# Patient Record
Sex: FEMALE | Race: WHITE | NOT HISPANIC OR LATINO | Employment: OTHER | RURAL
[De-identification: names, ages, dates, MRNs, and addresses within clinical notes are randomized per-mention and may not be internally consistent; named-entity substitution may affect disease eponyms.]

---

## 2020-09-25 ENCOUNTER — HISTORICAL (OUTPATIENT)
Dept: ADMINISTRATIVE | Facility: HOSPITAL | Age: 60
End: 2020-09-25

## 2020-09-25 LAB
ANION GAP SERPL CALCULATED.3IONS-SCNC: 12 MMOL/L
BASOPHILS # BLD AUTO: 0.06 X10E3/UL (ref 0–0.2)
BASOPHILS NFR BLD AUTO: 0.6 % (ref 0–1)
BILIRUB UR QL STRIP: NEGATIVE MG/DL
BUN SERPL-MCNC: 6 MG/DL (ref 7–18)
CALCIUM SERPL-MCNC: 9.4 MG/DL (ref 8.5–10.1)
CHLORIDE SERPL-SCNC: 97 MMOL/L (ref 98–107)
CLARITY UR: CLEAR
CO2 SERPL-SCNC: 30 MMOL/L (ref 21–32)
COLOR UR: ABNORMAL
CREAT SERPL-MCNC: 0.66 MG/DL (ref 0.55–1.02)
EOSINOPHIL # BLD AUTO: 0.12 X10E3/UL (ref 0–0.5)
EOSINOPHIL NFR BLD AUTO: 1.2 % (ref 1–4)
ERYTHROCYTE [DISTWIDTH] IN BLOOD BY AUTOMATED COUNT: 13.3 % (ref 11.5–14.5)
GLUCOSE SERPL-MCNC: 283 MG/DL (ref 70–105)
GLUCOSE SERPL-MCNC: 310 MG/DL (ref 74–106)
GLUCOSE SERPL-MCNC: 331 MG/DL (ref 70–105)
GLUCOSE UR STRIP-MCNC: >=1000 MG/DL
HCT VFR BLD AUTO: 46.5 % (ref 38–47)
HGB BLD-MCNC: 15.3 G/DL (ref 12–16)
IMM GRANULOCYTES # BLD AUTO: 0.04 X10E3/UL (ref 0–0.04)
IMM GRANULOCYTES NFR BLD: 0.4 % (ref 0–0.4)
KETONES UR STRIP-SCNC: NEGATIVE MG/DL
LEUKOCYTE ESTERASE UR QL STRIP: NEGATIVE LEU/UL
LYMPHOCYTES # BLD AUTO: 2.91 X10E3/UL (ref 1–4.8)
LYMPHOCYTES NFR BLD AUTO: 30.1 % (ref 27–41)
MCH RBC QN AUTO: 28.5 PG (ref 27–31)
MCHC RBC AUTO-ENTMCNC: 32.9 G/DL (ref 32–36)
MCV RBC AUTO: 86.6 FL (ref 80–96)
MONOCYTES # BLD AUTO: 0.86 X10E3/UL (ref 0–0.8)
MONOCYTES NFR BLD AUTO: 8.9 % (ref 2–6)
MPC BLD CALC-MCNC: 11.7 FL (ref 9.4–12.4)
NEUTROPHILS # BLD AUTO: 5.69 X10E3/UL (ref 1.8–7.7)
NEUTROPHILS NFR BLD AUTO: 58.8 % (ref 53–65)
NITRITE UR QL STRIP: NEGATIVE
NRBC # BLD AUTO: 0 X10E3/UL (ref 0–0)
NRBC, AUTO (.00): 0 /100 (ref 0–0)
PH UR STRIP: 6 PH UNITS (ref 5–8)
PLATELET # BLD AUTO: 224 X10E3/UL (ref 150–400)
POTASSIUM SERPL-SCNC: 3.9 MMOL/L (ref 3.5–5.1)
PROT UR QL STRIP: NEGATIVE MG/DL
RBC # BLD AUTO: 5.37 X10E6/UL (ref 4.2–5.4)
RBC # UR STRIP: NEGATIVE ERY/UL
SODIUM SERPL-SCNC: 135 MMOL/L (ref 136–145)
SP GR UR STRIP: <=1.005 (ref 1–1.03)
UROBILINOGEN UR STRIP-ACNC: 0.2 EU/DL
WBC # BLD AUTO: 9.68 X10E3/UL (ref 4.5–11)

## 2021-03-31 ENCOUNTER — HOSPITAL ENCOUNTER (INPATIENT)
Facility: HOSPITAL | Age: 61
LOS: 5 days | Discharge: HOME | DRG: 253 | End: 2021-04-05
Attending: STUDENT IN AN ORGANIZED HEALTH CARE EDUCATION/TRAINING PROGRAM | Admitting: SURGERY

## 2021-03-31 DIAGNOSIS — I96 GANGRENOUS TOE: ICD-10-CM

## 2021-03-31 DIAGNOSIS — I75.022 BLUE TOE SYNDROME OF LEFT LOWER EXTREMITY: ICD-10-CM

## 2021-03-31 DIAGNOSIS — I70.209 ATHEROSCLEROTIC PVD WITH ULCERATION: Primary | ICD-10-CM

## 2021-03-31 DIAGNOSIS — I10 HTN (HYPERTENSION): ICD-10-CM

## 2021-03-31 DIAGNOSIS — I70.243 ATHEROSCLEROSIS OF NATIVE ARTERY OF LEFT LOWER EXTREMITY WITH ULCERATION OF ANKLE: ICD-10-CM

## 2021-03-31 DIAGNOSIS — L98.499 ATHEROSCLEROTIC PVD WITH ULCERATION: Primary | ICD-10-CM

## 2021-03-31 PROBLEM — L97.509 FOOT ULCER: Status: ACTIVE | Noted: 2021-03-31

## 2021-03-31 PROBLEM — Z72.0 TOBACCO ABUSE: Status: ACTIVE | Noted: 2021-03-31

## 2021-03-31 LAB
ABO: NORMAL
ANION GAP SERPL CALCULATED.3IONS-SCNC: 15 MMOL/L
ANTIBODY SCREEN: NORMAL
BASOPHILS # BLD AUTO: 0.07 X10E3/UL (ref 0–0.2)
BASOPHILS NFR BLD AUTO: 0.8 % (ref 0–1)
BUN SERPL-MCNC: 5 MG/DL (ref 7–18)
CALCIUM SERPL-MCNC: 9 MG/DL (ref 8.5–10.1)
CHLORIDE SERPL-SCNC: 99 MMOL/L (ref 98–107)
CO2 SERPL-SCNC: 27 MMOL/L (ref 21–32)
CREAT SERPL-MCNC: 0.75 MG/DL (ref 0.55–1.02)
EOSINOPHIL # BLD AUTO: 0.03 X10E3/UL (ref 0–0.5)
EOSINOPHIL NFR BLD AUTO: 0.3 % (ref 1–4)
ERYTHROCYTE [DISTWIDTH] IN BLOOD BY AUTOMATED COUNT: 13 % (ref 11.5–14.5)
FLUAV AG UPPER RESP QL IA.RAPID: NEGATIVE
FLUBV AG UPPER RESP QL IA.RAPID: NEGATIVE
GLUCOSE SERPL-MCNC: 294 MG/DL (ref 70–105)
GLUCOSE SERPL-MCNC: 305 MG/DL (ref 70–105)
GLUCOSE SERPL-MCNC: 328 MG/DL (ref 74–106)
HCT VFR BLD AUTO: 45.3 % (ref 38–47)
HGB BLD-MCNC: 14.4 G/DL (ref 12–16)
IMM GRANULOCYTES # BLD AUTO: 0.02 X10E3/UL (ref 0–0.04)
IMM GRANULOCYTES NFR BLD: 0.2 % (ref 0–0.4)
LYMPHOCYTES # BLD AUTO: 2.29 X10E3/UL (ref 1–4.8)
LYMPHOCYTES NFR BLD AUTO: 26.3 % (ref 27–41)
MCH RBC QN AUTO: 28.1 PG (ref 27–31)
MCHC RBC AUTO-ENTMCNC: 31.8 G/DL (ref 32–36)
MCV RBC AUTO: 88.5 FL (ref 80–96)
MONOCYTES # BLD AUTO: 0.63 X10E3/UL (ref 0–0.8)
MONOCYTES NFR BLD AUTO: 7.2 % (ref 2–6)
MPC BLD CALC-MCNC: 11 FL (ref 9.4–12.4)
NEUTROPHILS # BLD AUTO: 5.66 X10E3/UL (ref 1.8–7.7)
NEUTROPHILS NFR BLD AUTO: 65.2 % (ref 53–65)
NRBC # BLD AUTO: 0 X10E3/UL (ref 0–0)
NRBC, AUTO (.00): 0 /100 (ref 0–0)
PLATELET # BLD AUTO: 236 X10E3/UL (ref 150–400)
POTASSIUM SERPL-SCNC: 4.1 MMOL/L (ref 3.5–5.1)
RBC # BLD AUTO: 5.12 X10E6/UL (ref 4.2–5.4)
RH TYPE: NORMAL
SARS-COV+SARS-COV-2 AG RESP QL IA.RAPID: NEGATIVE
SODIUM SERPL-SCNC: 137 MMOL/L (ref 136–145)
WBC # BLD AUTO: 8.7 X10E3/UL (ref 4.5–11)

## 2021-03-31 PROCEDURE — 25000003 PHARM REV CODE 250: Performed by: NURSE PRACTITIONER

## 2021-03-31 PROCEDURE — 93010 ELECTROCARDIOGRAM REPORT: CPT | Mod: ,,, | Performed by: INTERNAL MEDICINE

## 2021-03-31 PROCEDURE — 93010 EKG 12-LEAD: ICD-10-PCS | Mod: ,,, | Performed by: INTERNAL MEDICINE

## 2021-03-31 PROCEDURE — 85025 COMPLETE CBC W/AUTO DIFF WBC: CPT

## 2021-03-31 PROCEDURE — 82962 GLUCOSE BLOOD TEST: CPT

## 2021-03-31 PROCEDURE — 36415 COLL VENOUS BLD VENIPUNCTURE: CPT

## 2021-03-31 PROCEDURE — 11000001 HC ACUTE MED/SURG PRIVATE ROOM

## 2021-03-31 PROCEDURE — 99284 EMERGENCY DEPT VISIT MOD MDM: CPT | Mod: ,,, | Performed by: STUDENT IN AN ORGANIZED HEALTH CARE EDUCATION/TRAINING PROGRAM

## 2021-03-31 PROCEDURE — 99285 EMERGENCY DEPT VISIT HI MDM: CPT | Mod: 25

## 2021-03-31 PROCEDURE — 87428 SARSCOV & INF VIR A&B AG IA: CPT

## 2021-03-31 PROCEDURE — 93005 ELECTROCARDIOGRAM TRACING: CPT

## 2021-03-31 PROCEDURE — 63600175 PHARM REV CODE 636 W HCPCS: Performed by: NURSE PRACTITIONER

## 2021-03-31 PROCEDURE — C9399 UNCLASSIFIED DRUGS OR BIOLOG: HCPCS | Performed by: NURSE PRACTITIONER

## 2021-03-31 PROCEDURE — S4991 NICOTINE PATCH NONLEGEND: HCPCS | Performed by: SURGERY

## 2021-03-31 PROCEDURE — 96372 THER/PROPH/DIAG INJ SC/IM: CPT | Mod: 59

## 2021-03-31 PROCEDURE — 25500020 PHARM REV CODE 255: Performed by: SURGERY

## 2021-03-31 PROCEDURE — 99284 PR EMERGENCY DEPT VISIT,LEVEL IV: ICD-10-PCS | Mod: ,,, | Performed by: STUDENT IN AN ORGANIZED HEALTH CARE EDUCATION/TRAINING PROGRAM

## 2021-03-31 PROCEDURE — 80048 BASIC METABOLIC PNL TOTAL CA: CPT

## 2021-03-31 PROCEDURE — 25000003 PHARM REV CODE 250: Performed by: SURGERY

## 2021-03-31 RX ORDER — SODIUM CHLORIDE 9 MG/ML
INJECTION, SOLUTION INTRAVENOUS CONTINUOUS
Status: DISCONTINUED | OUTPATIENT
Start: 2021-03-31 | End: 2021-04-05 | Stop reason: HOSPADM

## 2021-03-31 RX ORDER — PROCHLORPERAZINE EDISYLATE 5 MG/ML
5 INJECTION INTRAMUSCULAR; INTRAVENOUS EVERY 6 HOURS PRN
Status: DISCONTINUED | OUTPATIENT
Start: 2021-03-31 | End: 2021-04-05 | Stop reason: HOSPADM

## 2021-03-31 RX ORDER — PRAVASTATIN SODIUM 10 MG/1
40 TABLET ORAL NIGHTLY
Status: DISCONTINUED | OUTPATIENT
Start: 2021-03-31 | End: 2021-04-05 | Stop reason: HOSPADM

## 2021-03-31 RX ORDER — ACETAMINOPHEN 325 MG/1
650 TABLET ORAL EVERY 4 HOURS PRN
Status: DISCONTINUED | OUTPATIENT
Start: 2021-03-31 | End: 2021-04-05 | Stop reason: HOSPADM

## 2021-03-31 RX ORDER — POLYETHYLENE GLYCOL 3350 17 G/17G
17 POWDER, FOR SOLUTION ORAL DAILY
Status: DISCONTINUED | OUTPATIENT
Start: 2021-04-01 | End: 2021-04-05 | Stop reason: HOSPADM

## 2021-03-31 RX ORDER — HYDROCODONE BITARTRATE AND ACETAMINOPHEN 10; 325 MG/1; MG/1
1 TABLET ORAL EVERY 4 HOURS PRN
Status: DISCONTINUED | OUTPATIENT
Start: 2021-03-31 | End: 2021-04-05 | Stop reason: HOSPADM

## 2021-03-31 RX ORDER — LISINOPRIL AND HYDROCHLOROTHIAZIDE 20; 25 MG/1; MG/1
1 TABLET ORAL DAILY
Status: ON HOLD | COMMUNITY
End: 2022-07-11 | Stop reason: HOSPADM

## 2021-03-31 RX ORDER — ASPIRIN 81 MG/1
81 TABLET ORAL DAILY
Status: DISCONTINUED | OUTPATIENT
Start: 2021-04-01 | End: 2021-04-05 | Stop reason: HOSPADM

## 2021-03-31 RX ORDER — ONDANSETRON 2 MG/ML
4 INJECTION INTRAMUSCULAR; INTRAVENOUS EVERY 8 HOURS PRN
Status: DISCONTINUED | OUTPATIENT
Start: 2021-03-31 | End: 2021-04-05 | Stop reason: HOSPADM

## 2021-03-31 RX ORDER — IBUPROFEN 200 MG
24 TABLET ORAL
Status: DISCONTINUED | OUTPATIENT
Start: 2021-03-31 | End: 2021-04-01

## 2021-03-31 RX ORDER — IBUPROFEN 200 MG
16 TABLET ORAL
Status: DISCONTINUED | OUTPATIENT
Start: 2021-03-31 | End: 2021-04-01

## 2021-03-31 RX ORDER — IBUPROFEN 200 MG
1 TABLET ORAL DAILY
Status: DISCONTINUED | OUTPATIENT
Start: 2021-03-31 | End: 2021-04-05 | Stop reason: HOSPADM

## 2021-03-31 RX ORDER — FAMOTIDINE 20 MG/1
20 TABLET, FILM COATED ORAL 2 TIMES DAILY
Status: DISCONTINUED | OUTPATIENT
Start: 2021-03-31 | End: 2021-04-05 | Stop reason: HOSPADM

## 2021-03-31 RX ORDER — ASPIRIN 81 MG/1
81 TABLET ORAL DAILY
Status: ON HOLD | COMMUNITY
End: 2022-07-07 | Stop reason: ALTCHOICE

## 2021-03-31 RX ORDER — LOVASTATIN 40 MG/1
40 TABLET ORAL NIGHTLY
Status: ON HOLD | COMMUNITY
End: 2022-07-11 | Stop reason: HOSPADM

## 2021-03-31 RX ORDER — ENOXAPARIN SODIUM 100 MG/ML
40 INJECTION SUBCUTANEOUS EVERY 24 HOURS
Status: DISCONTINUED | OUTPATIENT
Start: 2021-03-31 | End: 2021-04-05 | Stop reason: HOSPADM

## 2021-03-31 RX ORDER — SODIUM CHLORIDE 0.9 % (FLUSH) 0.9 %
10 SYRINGE (ML) INJECTION
Status: DISCONTINUED | OUTPATIENT
Start: 2021-03-31 | End: 2021-04-05 | Stop reason: HOSPADM

## 2021-03-31 RX ORDER — LISINOPRIL AND HYDROCHLOROTHIAZIDE 20; 25 MG/1; MG/1
1 TABLET ORAL DAILY
Status: DISCONTINUED | OUTPATIENT
Start: 2021-04-01 | End: 2021-04-05 | Stop reason: HOSPADM

## 2021-03-31 RX ORDER — FAMOTIDINE 20 MG/1
20 TABLET, FILM COATED ORAL NIGHTLY PRN
Status: ON HOLD | COMMUNITY
End: 2022-07-11 | Stop reason: HOSPADM

## 2021-03-31 RX ORDER — GLUCAGON 1 MG
1 KIT INJECTION
Status: DISCONTINUED | OUTPATIENT
Start: 2021-03-31 | End: 2021-04-01

## 2021-03-31 RX ORDER — INSULIN ASPART 100 [IU]/ML
0-5 INJECTION, SOLUTION INTRAVENOUS; SUBCUTANEOUS
Status: DISCONTINUED | OUTPATIENT
Start: 2021-03-31 | End: 2021-04-01

## 2021-03-31 RX ORDER — KETOROLAC TROMETHAMINE 30 MG/ML
15 INJECTION, SOLUTION INTRAMUSCULAR; INTRAVENOUS EVERY 6 HOURS PRN
Status: DISCONTINUED | OUTPATIENT
Start: 2021-03-31 | End: 2021-04-02

## 2021-03-31 RX ORDER — METFORMIN HYDROCHLORIDE 1000 MG/1
1000 TABLET ORAL 2 TIMES DAILY WITH MEALS
COMMUNITY

## 2021-03-31 RX ORDER — GLIPIZIDE 10 MG/1
10 TABLET ORAL
COMMUNITY

## 2021-03-31 RX ORDER — TALC
6 POWDER (GRAM) TOPICAL NIGHTLY PRN
Status: DISCONTINUED | OUTPATIENT
Start: 2021-03-31 | End: 2021-04-05 | Stop reason: HOSPADM

## 2021-03-31 RX ADMIN — PRAVASTATIN SODIUM 40 MG: 10 TABLET ORAL at 09:03

## 2021-03-31 RX ADMIN — SODIUM CHLORIDE: 9 INJECTION, SOLUTION INTRAVENOUS at 09:03

## 2021-03-31 RX ADMIN — IOPAMIDOL 100 ML: 755 INJECTION, SOLUTION INTRAVENOUS at 05:03

## 2021-03-31 RX ADMIN — INSULIN DETEMIR 10 UNITS: 100 INJECTION, SOLUTION SUBCUTANEOUS at 09:03

## 2021-03-31 RX ADMIN — FAMOTIDINE 20 MG: 20 TABLET, FILM COATED ORAL at 09:03

## 2021-03-31 RX ADMIN — NICOTINE 1 PATCH: 14 PATCH, EXTENDED RELEASE TRANSDERMAL at 10:03

## 2021-03-31 RX ADMIN — AMPICILLIN SODIUM AND SULBACTAM SODIUM 3 G: 2; 1 INJECTION, POWDER, FOR SOLUTION INTRAMUSCULAR; INTRAVENOUS at 07:03

## 2021-03-31 RX ADMIN — ENOXAPARIN SODIUM 40 MG: 40 INJECTION SUBCUTANEOUS at 07:03

## 2021-04-01 LAB
AV INDEX (PROSTH): 0.76
AV MEAN GRADIENT: 4 MMHG
AV VALVE AREA: 2.16 CM2
BSA FOR ECHO PROCEDURE: 2.01 M2
CV ECHO LV RWT: 0.66 CM
CV STRESS BASE HR: 75 BPM
DIASTOLIC BLOOD PRESSURE: 60 MMHG
DOP CALC AO VTI: 27.3 CM
DOP CALC LVOT AREA: 2.8 CM2
DOP CALC LVOT DIAMETER: 1.9 CM
DOP CALC LVOT STROKE VOLUME: 58.86 CM3
DOP CALCLVOT PEAK VEL VTI: 20.77 CM
E WAVE DECELERATION TIME: 150 MSEC
ECHO LV POSTERIOR WALL: 1.03 CM (ref 0.6–1.1)
EST. AVERAGE GLUCOSE BLD GHB EST-MCNC: 247 MG/DL
FRACTIONAL SHORTENING: 15 % (ref 28–44)
GLUCOSE SERPL-MCNC: 181 MG/DL (ref 70–105)
GLUCOSE SERPL-MCNC: 274 MG/DL (ref 70–105)
GLUCOSE SERPL-MCNC: 299 MG/DL (ref 70–105)
HBA1C MFR BLD HPLC: 10 %
INTERVENTRICULAR SEPTUM: 1.07 CM (ref 0.6–1.1)
IVC DIAMETER: 1.1 CM
LEFT INTERNAL DIMENSION IN SYSTOLE: 2.68 CM (ref 2.1–4)
LEFT VENTRICLE MASS INDEX: 48 G/M2
LEFT VENTRICULAR INTERNAL DIMENSION IN DIASTOLE: 3.14 CM (ref 3.5–6)
LEFT VENTRICULAR MASS: 94.55 G
MV PEAK E VEL: 0.93 M/S
OHS CV CPX 1 MINUTE RECOVERY HEART RATE: 105 BPM
OHS CV CPX 85 PERCENT MAX PREDICTED HEART RATE MALE: 130
OHS CV CPX MAX PREDICTED HEART RATE: 153
OHS CV CPX PATIENT IS FEMALE: 1
OHS CV CPX PATIENT IS MALE: 0
OHS CV CPX PEAK DIASTOLIC BLOOD PRESSURE: 62 MMHG
OHS CV CPX PEAK HEAR RATE: 153 BPM
OHS CV CPX PEAK RATE PRESSURE PRODUCT: NORMAL
OHS CV CPX PEAK SYSTOLIC BLOOD PRESSURE: 159 MMHG
OHS CV CPX PERCENT MAX PREDICTED HEART RATE ACHIEVED: 100
OHS CV CPX RATE PRESSURE PRODUCT PRESENTING: 8400
RA PRESSURE: 3 MMHG
RIGHT VENTRICULAR END-DIASTOLIC DIMENSION: 2.6 CM
STRESS ST DEPRESSION: 1 MM
SYSTOLIC BLOOD PRESSURE: 112 MMHG
TRICUSPID ANNULAR PLANE SYSTOLIC EXCURSION: 2 CM

## 2021-04-01 PROCEDURE — 83036 HEMOGLOBIN GLYCOSYLATED A1C: CPT

## 2021-04-01 PROCEDURE — 99254 IP/OBS CNSLTJ NEW/EST MOD 60: CPT | Mod: 25,,, | Performed by: INTERNAL MEDICINE

## 2021-04-01 PROCEDURE — C9399 UNCLASSIFIED DRUGS OR BIOLOG: HCPCS | Performed by: NURSE PRACTITIONER

## 2021-04-01 PROCEDURE — 63600175 PHARM REV CODE 636 W HCPCS

## 2021-04-01 PROCEDURE — 11000001 HC ACUTE MED/SURG PRIVATE ROOM

## 2021-04-01 PROCEDURE — 25000003 PHARM REV CODE 250: Performed by: NURSE PRACTITIONER

## 2021-04-01 PROCEDURE — 25000003 PHARM REV CODE 250: Performed by: SURGERY

## 2021-04-01 PROCEDURE — 25000003 PHARM REV CODE 250

## 2021-04-01 PROCEDURE — 63600175 PHARM REV CODE 636 W HCPCS: Performed by: NURSE PRACTITIONER

## 2021-04-01 PROCEDURE — 82962 GLUCOSE BLOOD TEST: CPT

## 2021-04-01 PROCEDURE — S4991 NICOTINE PATCH NONLEGEND: HCPCS | Performed by: SURGERY

## 2021-04-01 PROCEDURE — 99254 PR INITIAL INPATIENT CONSULT,LEVL IV: ICD-10-PCS | Mod: 25,,, | Performed by: INTERNAL MEDICINE

## 2021-04-01 RX ORDER — INSULIN ASPART 100 [IU]/ML
1-10 INJECTION, SOLUTION INTRAVENOUS; SUBCUTANEOUS
Status: DISCONTINUED | OUTPATIENT
Start: 2021-04-01 | End: 2021-04-05 | Stop reason: HOSPADM

## 2021-04-01 RX ORDER — METOPROLOL TARTRATE 25 MG/1
25 TABLET, FILM COATED ORAL 2 TIMES DAILY
Status: DISCONTINUED | OUTPATIENT
Start: 2021-04-01 | End: 2021-04-05 | Stop reason: HOSPADM

## 2021-04-01 RX ADMIN — INSULIN DETEMIR 10 UNITS: 100 INJECTION, SOLUTION SUBCUTANEOUS at 08:04

## 2021-04-01 RX ADMIN — LISINOPRIL AND HYDROCHLOROTHIAZIDE 1 TABLET: 25; 20 TABLET ORAL at 09:04

## 2021-04-01 RX ADMIN — INSULIN ASPART 6 UNITS: 100 INJECTION, SOLUTION INTRAVENOUS; SUBCUTANEOUS at 05:04

## 2021-04-01 RX ADMIN — AMPICILLIN SODIUM AND SULBACTAM SODIUM 3 G: 2; 1 INJECTION, POWDER, FOR SOLUTION INTRAMUSCULAR; INTRAVENOUS at 08:04

## 2021-04-01 RX ADMIN — INSULIN ASPART 3 UNITS: 100 INJECTION, SOLUTION INTRAVENOUS; SUBCUTANEOUS at 06:04

## 2021-04-01 RX ADMIN — POLYETHYLENE GLYCOL 3350 17 G: 17 POWDER, FOR SOLUTION ORAL at 08:04

## 2021-04-01 RX ADMIN — AMPICILLIN SODIUM AND SULBACTAM SODIUM 3 G: 2; 1 INJECTION, POWDER, FOR SOLUTION INTRAMUSCULAR; INTRAVENOUS at 02:04

## 2021-04-01 RX ADMIN — METOPROLOL TARTRATE 25 MG: 25 TABLET, FILM COATED ORAL at 08:04

## 2021-04-01 RX ADMIN — PRAVASTATIN SODIUM 40 MG: 10 TABLET ORAL at 08:04

## 2021-04-01 RX ADMIN — NICOTINE 1 PATCH: 14 PATCH, EXTENDED RELEASE TRANSDERMAL at 08:04

## 2021-04-01 RX ADMIN — FAMOTIDINE 20 MG: 20 TABLET, FILM COATED ORAL at 08:04

## 2021-04-01 RX ADMIN — ASPIRIN 81 MG: 81 TABLET, COATED ORAL at 08:04

## 2021-04-01 RX ADMIN — ENOXAPARIN SODIUM 40 MG: 40 INJECTION SUBCUTANEOUS at 04:04

## 2021-04-02 ENCOUNTER — ANESTHESIA (OUTPATIENT)
Dept: SURGERY | Facility: HOSPITAL | Age: 61
DRG: 253 | End: 2021-04-02

## 2021-04-02 ENCOUNTER — ANESTHESIA EVENT (OUTPATIENT)
Dept: SURGERY | Facility: HOSPITAL | Age: 61
DRG: 253 | End: 2021-04-02

## 2021-04-02 PROBLEM — I73.9 PVD (PERIPHERAL VASCULAR DISEASE): Status: ACTIVE | Noted: 2021-04-02

## 2021-04-02 PROBLEM — I75.022 BLUE TOE SYNDROME OF LEFT LOWER EXTREMITY: Status: ACTIVE | Noted: 2021-04-02

## 2021-04-02 LAB
GLUCOSE SERPL-MCNC: 184 MG/DL (ref 70–105)
GLUCOSE SERPL-MCNC: 192 MG/DL (ref 70–105)
GLUCOSE SERPL-MCNC: 201 MG/DL (ref 70–105)
GLUCOSE SERPL-MCNC: 211 MG/DL (ref 70–105)
GLUCOSE SERPL-MCNC: 215 MG/DL (ref 70–105)
GLUCOSE SERPL-MCNC: 262 MG/DL (ref 70–105)

## 2021-04-02 PROCEDURE — 20000000 HC ICU ROOM

## 2021-04-02 PROCEDURE — D9220A PRA ANESTHESIA: Mod: ,,, | Performed by: ANESTHESIOLOGY

## 2021-04-02 PROCEDURE — 37000008 HC ANESTHESIA 1ST 15 MINUTES: Performed by: SURGERY

## 2021-04-02 PROCEDURE — 63600175 PHARM REV CODE 636 W HCPCS: Performed by: NURSE PRACTITIONER

## 2021-04-02 PROCEDURE — C1768 GRAFT, VASCULAR: HCPCS | Performed by: SURGERY

## 2021-04-02 PROCEDURE — 36000708 HC OR TIME LEV III 1ST 15 MIN: Performed by: SURGERY

## 2021-04-02 PROCEDURE — 27000716 HC OXISENSOR PROBE, ANY SIZE: Performed by: ANESTHESIOLOGY

## 2021-04-02 PROCEDURE — C9399 UNCLASSIFIED DRUGS OR BIOLOG: HCPCS | Performed by: SURGERY

## 2021-04-02 PROCEDURE — 36000709 HC OR TIME LEV III EA ADD 15 MIN: Performed by: SURGERY

## 2021-04-02 PROCEDURE — 63600175 PHARM REV CODE 636 W HCPCS

## 2021-04-02 PROCEDURE — 82962 GLUCOSE BLOOD TEST: CPT

## 2021-04-02 PROCEDURE — 27000510 HC BLANKET BAIR HUGGER ANY SIZE: Performed by: ANESTHESIOLOGY

## 2021-04-02 PROCEDURE — 63600175 PHARM REV CODE 636 W HCPCS: Performed by: SURGERY

## 2021-04-02 PROCEDURE — 25000003 PHARM REV CODE 250: Performed by: NURSE PRACTITIONER

## 2021-04-02 PROCEDURE — 25000003 PHARM REV CODE 250: Performed by: SURGERY

## 2021-04-02 PROCEDURE — 27100025 HC TUBING, SET FLUID WARMER: Performed by: ANESTHESIOLOGY

## 2021-04-02 PROCEDURE — 27000689 HC BLADE LARYNGOSCOPE ANY SIZE: Performed by: ANESTHESIOLOGY

## 2021-04-02 PROCEDURE — 35656 BPG FEMORAL-POPLITEAL: CPT | Mod: LT,,, | Performed by: SURGERY

## 2021-04-02 PROCEDURE — 25000003 PHARM REV CODE 250: Performed by: NURSE ANESTHETIST, CERTIFIED REGISTERED

## 2021-04-02 PROCEDURE — 27000165 HC TUBE, ETT CUFFED: Performed by: ANESTHESIOLOGY

## 2021-04-02 PROCEDURE — 37000009 HC ANESTHESIA EA ADD 15 MINS: Performed by: SURGERY

## 2021-04-02 PROCEDURE — 27201423 OPTIME MED/SURG SUP & DEVICES STERILE SUPPLY: Performed by: SURGERY

## 2021-04-02 PROCEDURE — 63600175 PHARM REV CODE 636 W HCPCS: Performed by: NURSE ANESTHETIST, CERTIFIED REGISTERED

## 2021-04-02 PROCEDURE — 27000260 *HC AIRWAY ORAL: Performed by: ANESTHESIOLOGY

## 2021-04-02 PROCEDURE — 27000509 HC TUBE SALEM SUMP ANY SIZE: Performed by: ANESTHESIOLOGY

## 2021-04-02 PROCEDURE — 94761 N-INVAS EAR/PLS OXIMETRY MLT: CPT

## 2021-04-02 PROCEDURE — 27202716 HC PROBE, NASAL TEMP MONITORING: Performed by: ANESTHESIOLOGY

## 2021-04-02 PROCEDURE — 27000221 HC OXYGEN, UP TO 24 HOURS

## 2021-04-02 PROCEDURE — 35656 PR BYPASS GRAFT OTHR,FEM-POP: ICD-10-PCS | Mod: LT,,, | Performed by: SURGERY

## 2021-04-02 PROCEDURE — D9220A PRA ANESTHESIA: ICD-10-PCS | Mod: ,,, | Performed by: ANESTHESIOLOGY

## 2021-04-02 DEVICE — IMPLANTABLE DEVICE: Type: IMPLANTABLE DEVICE | Site: LEG | Status: FUNCTIONAL

## 2021-04-02 RX ORDER — LIDOCAINE HYDROCHLORIDE 20 MG/ML
INJECTION INTRAVENOUS
Status: DISCONTINUED | OUTPATIENT
Start: 2021-04-02 | End: 2021-04-02

## 2021-04-02 RX ORDER — PROTAMINE SULFATE 10 MG/ML
INJECTION, SOLUTION INTRAVENOUS
Status: DISCONTINUED | OUTPATIENT
Start: 2021-04-02 | End: 2021-04-02

## 2021-04-02 RX ORDER — PROPOFOL 10 MG/ML
VIAL (ML) INTRAVENOUS
Status: DISCONTINUED | OUTPATIENT
Start: 2021-04-02 | End: 2021-04-02

## 2021-04-02 RX ORDER — HEPARIN SODIUM 1000 [USP'U]/ML
INJECTION, SOLUTION INTRAVENOUS; SUBCUTANEOUS
Status: DISCONTINUED | OUTPATIENT
Start: 2021-04-02 | End: 2021-04-02 | Stop reason: HOSPADM

## 2021-04-02 RX ORDER — HYDROMORPHONE HYDROCHLORIDE 2 MG/ML
0.5 INJECTION, SOLUTION INTRAMUSCULAR; INTRAVENOUS; SUBCUTANEOUS EVERY 5 MIN PRN
Status: DISCONTINUED | OUTPATIENT
Start: 2021-04-02 | End: 2021-04-02

## 2021-04-02 RX ORDER — GLYCOPYRROLATE 0.2 MG/ML
INJECTION INTRAMUSCULAR; INTRAVENOUS
Status: DISCONTINUED | OUTPATIENT
Start: 2021-04-02 | End: 2021-04-02

## 2021-04-02 RX ORDER — MORPHINE SULFATE 10 MG/ML
4 INJECTION INTRAMUSCULAR; INTRAVENOUS; SUBCUTANEOUS EVERY 5 MIN PRN
Status: DISCONTINUED | OUTPATIENT
Start: 2021-04-02 | End: 2021-04-02

## 2021-04-02 RX ORDER — MORPHINE SULFATE 4 MG/ML
INJECTION, SOLUTION INTRAMUSCULAR; INTRAVENOUS
Status: COMPLETED
Start: 2021-04-02 | End: 2021-04-02

## 2021-04-02 RX ORDER — ONDANSETRON 2 MG/ML
4 INJECTION INTRAMUSCULAR; INTRAVENOUS DAILY PRN
Status: DISCONTINUED | OUTPATIENT
Start: 2021-04-02 | End: 2021-04-02

## 2021-04-02 RX ORDER — FENTANYL CITRATE 50 UG/ML
INJECTION, SOLUTION INTRAMUSCULAR; INTRAVENOUS
Status: DISCONTINUED | OUTPATIENT
Start: 2021-04-02 | End: 2021-04-02

## 2021-04-02 RX ORDER — NEOSTIGMINE METHYLSULFATE 1 MG/ML
INJECTION, SOLUTION INTRAVENOUS
Status: DISCONTINUED | OUTPATIENT
Start: 2021-04-02 | End: 2021-04-02

## 2021-04-02 RX ORDER — MORPHINE SULFATE 4 MG/ML
4 INJECTION, SOLUTION INTRAMUSCULAR; INTRAVENOUS EVERY 4 HOURS PRN
Status: DISCONTINUED | OUTPATIENT
Start: 2021-04-02 | End: 2021-04-05 | Stop reason: HOSPADM

## 2021-04-02 RX ORDER — ONDANSETRON 2 MG/ML
INJECTION INTRAMUSCULAR; INTRAVENOUS
Status: DISCONTINUED | OUTPATIENT
Start: 2021-04-02 | End: 2021-04-02

## 2021-04-02 RX ORDER — HYDROCODONE BITARTRATE AND ACETAMINOPHEN 5; 325 MG/1; MG/1
2 TABLET ORAL EVERY 6 HOURS PRN
Status: DISCONTINUED | OUTPATIENT
Start: 2021-04-02 | End: 2021-04-05 | Stop reason: HOSPADM

## 2021-04-02 RX ORDER — EPHEDRINE SULFATE 50 MG/ML
INJECTION, SOLUTION INTRAVENOUS
Status: DISCONTINUED | OUTPATIENT
Start: 2021-04-02 | End: 2021-04-02

## 2021-04-02 RX ORDER — DIPHENHYDRAMINE HYDROCHLORIDE 50 MG/ML
25 INJECTION INTRAMUSCULAR; INTRAVENOUS EVERY 6 HOURS PRN
Status: DISCONTINUED | OUTPATIENT
Start: 2021-04-02 | End: 2021-04-02

## 2021-04-02 RX ORDER — ROCURONIUM BROMIDE 10 MG/ML
INJECTION, SOLUTION INTRAVENOUS
Status: DISCONTINUED | OUTPATIENT
Start: 2021-04-02 | End: 2021-04-02

## 2021-04-02 RX ORDER — MEPERIDINE HYDROCHLORIDE 25 MG/ML
25 INJECTION INTRAMUSCULAR; INTRAVENOUS; SUBCUTANEOUS EVERY 10 MIN PRN
Status: DISCONTINUED | OUTPATIENT
Start: 2021-04-02 | End: 2021-04-02

## 2021-04-02 RX ORDER — HEPARIN SODIUM 1000 [USP'U]/ML
INJECTION, SOLUTION INTRAVENOUS; SUBCUTANEOUS
Status: DISCONTINUED | OUTPATIENT
Start: 2021-04-02 | End: 2021-04-02

## 2021-04-02 RX ADMIN — ROCURONIUM BROMIDE 30 MG: 10 INJECTION INTRAVENOUS at 08:04

## 2021-04-02 RX ADMIN — HYDROCODONE BITARTRATE AND ACETAMINOPHEN 2 TABLET: 5; 325 TABLET ORAL at 02:04

## 2021-04-02 RX ADMIN — ONDANSETRON 4 MG: 2 INJECTION INTRAMUSCULAR; INTRAVENOUS at 09:04

## 2021-04-02 RX ADMIN — CEFAZOLIN 2 G: 1 INJECTION, POWDER, FOR SOLUTION INTRAMUSCULAR; INTRAVENOUS; PARENTERAL at 08:04

## 2021-04-02 RX ADMIN — HEPARIN SODIUM 4000 UNITS: 1000 INJECTION INTRAVENOUS; SUBCUTANEOUS at 09:04

## 2021-04-02 RX ADMIN — INSULIN ASPART 2 UNITS: 100 INJECTION, SOLUTION INTRAVENOUS; SUBCUTANEOUS at 05:04

## 2021-04-02 RX ADMIN — INSULIN DETEMIR 10 UNITS: 100 INJECTION, SOLUTION SUBCUTANEOUS at 09:04

## 2021-04-02 RX ADMIN — LIDOCAINE HYDROCHLORIDE 75 MG: 20 INJECTION, SOLUTION INTRAVENOUS at 08:04

## 2021-04-02 RX ADMIN — MORPHINE SULFATE 4 MG: 4 INJECTION INTRAVENOUS at 11:04

## 2021-04-02 RX ADMIN — PRAVASTATIN SODIUM 40 MG: 10 TABLET ORAL at 09:04

## 2021-04-02 RX ADMIN — EPHEDRINE SULFATE 25 MG: 50 INJECTION INTRAVENOUS at 08:04

## 2021-04-02 RX ADMIN — GLYCOPYRROLATE 0.4 MCG: 0.2 INJECTION INTRAMUSCULAR; INTRAVENOUS at 09:04

## 2021-04-02 RX ADMIN — FENTANYL CITRATE 100 MCG: 50 INJECTION INTRAMUSCULAR; INTRAVENOUS at 09:04

## 2021-04-02 RX ADMIN — SODIUM CHLORIDE: 9 INJECTION, SOLUTION INTRAVENOUS at 01:04

## 2021-04-02 RX ADMIN — NEOSTIGMINE METHYLSULFATE 3 MG: 1 INJECTION INTRAVENOUS at 09:04

## 2021-04-02 RX ADMIN — MORPHINE SULFATE 4 MG: 4 INJECTION INTRAVENOUS at 10:04

## 2021-04-02 RX ADMIN — ENOXAPARIN SODIUM 40 MG: 40 INJECTION SUBCUTANEOUS at 05:04

## 2021-04-02 RX ADMIN — AMPICILLIN SODIUM AND SULBACTAM SODIUM 3 G: 2; 1 INJECTION, POWDER, FOR SOLUTION INTRAMUSCULAR; INTRAVENOUS at 09:04

## 2021-04-02 RX ADMIN — PROPOFOL 150 MG: 10 INJECTION, EMULSION INTRAVENOUS at 08:04

## 2021-04-02 RX ADMIN — FAMOTIDINE 20 MG: 20 TABLET, FILM COATED ORAL at 09:04

## 2021-04-02 RX ADMIN — PROTAMINE SULFATE 25 MG: 10 INJECTION, SOLUTION INTRAVENOUS at 09:04

## 2021-04-02 RX ADMIN — AMPICILLIN SODIUM AND SULBACTAM SODIUM 3 G: 2; 1 INJECTION, POWDER, FOR SOLUTION INTRAMUSCULAR; INTRAVENOUS at 01:04

## 2021-04-02 RX ADMIN — ROCURONIUM BROMIDE 50 MG: 10 INJECTION INTRAVENOUS at 08:04

## 2021-04-02 RX ADMIN — EPHEDRINE SULFATE 25 MG: 50 INJECTION INTRAVENOUS at 09:04

## 2021-04-02 RX ADMIN — FENTANYL CITRATE 100 MCG: 50 INJECTION INTRAMUSCULAR; INTRAVENOUS at 08:04

## 2021-04-02 RX ADMIN — AMPICILLIN SODIUM AND SULBACTAM SODIUM 3 G: 2; 1 INJECTION, POWDER, FOR SOLUTION INTRAMUSCULAR; INTRAVENOUS at 02:04

## 2021-04-02 RX ADMIN — METOPROLOL TARTRATE 25 MG: 25 TABLET, FILM COATED ORAL at 09:04

## 2021-04-03 LAB
GLUCOSE SERPL-MCNC: 195 MG/DL (ref 70–105)
GLUCOSE SERPL-MCNC: 211 MG/DL (ref 70–105)
GLUCOSE SERPL-MCNC: 273 MG/DL (ref 70–105)

## 2021-04-03 PROCEDURE — 11000001 HC ACUTE MED/SURG PRIVATE ROOM

## 2021-04-03 PROCEDURE — 25000003 PHARM REV CODE 250: Performed by: SURGERY

## 2021-04-03 PROCEDURE — 94761 N-INVAS EAR/PLS OXIMETRY MLT: CPT

## 2021-04-03 PROCEDURE — C9399 UNCLASSIFIED DRUGS OR BIOLOG: HCPCS | Performed by: SURGERY

## 2021-04-03 PROCEDURE — 97165 OT EVAL LOW COMPLEX 30 MIN: CPT

## 2021-04-03 PROCEDURE — 82962 GLUCOSE BLOOD TEST: CPT

## 2021-04-03 PROCEDURE — 63600175 PHARM REV CODE 636 W HCPCS: Performed by: SURGERY

## 2021-04-03 PROCEDURE — S4991 NICOTINE PATCH NONLEGEND: HCPCS | Performed by: SURGERY

## 2021-04-03 PROCEDURE — 97161 PT EVAL LOW COMPLEX 20 MIN: CPT

## 2021-04-03 PROCEDURE — 27000221 HC OXYGEN, UP TO 24 HOURS

## 2021-04-03 RX ADMIN — FAMOTIDINE 20 MG: 20 TABLET, FILM COATED ORAL at 08:04

## 2021-04-03 RX ADMIN — AMPICILLIN SODIUM AND SULBACTAM SODIUM 3 G: 2; 1 INJECTION, POWDER, FOR SOLUTION INTRAMUSCULAR; INTRAVENOUS at 08:04

## 2021-04-03 RX ADMIN — METOPROLOL TARTRATE 25 MG: 25 TABLET, FILM COATED ORAL at 08:04

## 2021-04-03 RX ADMIN — INSULIN ASPART 4 UNITS: 100 INJECTION, SOLUTION INTRAVENOUS; SUBCUTANEOUS at 11:04

## 2021-04-03 RX ADMIN — ASPIRIN 81 MG: 81 TABLET, COATED ORAL at 08:04

## 2021-04-03 RX ADMIN — SODIUM CHLORIDE: 9 INJECTION, SOLUTION INTRAVENOUS at 08:04

## 2021-04-03 RX ADMIN — AMPICILLIN SODIUM AND SULBACTAM SODIUM 3 G: 2; 1 INJECTION, POWDER, FOR SOLUTION INTRAMUSCULAR; INTRAVENOUS at 01:04

## 2021-04-03 RX ADMIN — HYDROCODONE BITARTRATE AND ACETAMINOPHEN 2 TABLET: 5; 325 TABLET ORAL at 08:04

## 2021-04-03 RX ADMIN — ENOXAPARIN SODIUM 40 MG: 40 INJECTION SUBCUTANEOUS at 05:04

## 2021-04-03 RX ADMIN — HYDROCODONE BITARTRATE AND ACETAMINOPHEN 1 TABLET: 10; 325 TABLET ORAL at 06:04

## 2021-04-03 RX ADMIN — INSULIN ASPART 4 UNITS: 100 INJECTION, SOLUTION INTRAVENOUS; SUBCUTANEOUS at 05:04

## 2021-04-03 RX ADMIN — NICOTINE 1 PATCH: 14 PATCH, EXTENDED RELEASE TRANSDERMAL at 08:04

## 2021-04-03 RX ADMIN — INSULIN ASPART 3 UNITS: 100 INJECTION, SOLUTION INTRAVENOUS; SUBCUTANEOUS at 08:04

## 2021-04-03 RX ADMIN — FAMOTIDINE 20 MG: 20 TABLET, FILM COATED ORAL at 09:04

## 2021-04-03 RX ADMIN — MELATONIN 6 MG: at 08:04

## 2021-04-03 RX ADMIN — INSULIN DETEMIR 10 UNITS: 100 INJECTION, SOLUTION SUBCUTANEOUS at 08:04

## 2021-04-03 RX ADMIN — AMPICILLIN SODIUM AND SULBACTAM SODIUM 3 G: 2; 1 INJECTION, POWDER, FOR SOLUTION INTRAMUSCULAR; INTRAVENOUS at 02:04

## 2021-04-03 RX ADMIN — PRAVASTATIN SODIUM 40 MG: 10 TABLET ORAL at 08:04

## 2021-04-04 LAB
GLUCOSE SERPL-MCNC: 192 MG/DL (ref 70–105)
GLUCOSE SERPL-MCNC: 262 MG/DL (ref 70–105)
GLUCOSE SERPL-MCNC: 308 MG/DL (ref 70–105)

## 2021-04-04 PROCEDURE — 25000003 PHARM REV CODE 250: Performed by: SURGERY

## 2021-04-04 PROCEDURE — S4991 NICOTINE PATCH NONLEGEND: HCPCS | Performed by: SURGERY

## 2021-04-04 PROCEDURE — 94761 N-INVAS EAR/PLS OXIMETRY MLT: CPT

## 2021-04-04 PROCEDURE — 11000001 HC ACUTE MED/SURG PRIVATE ROOM

## 2021-04-04 PROCEDURE — 27000221 HC OXYGEN, UP TO 24 HOURS

## 2021-04-04 PROCEDURE — C9399 UNCLASSIFIED DRUGS OR BIOLOG: HCPCS | Performed by: SURGERY

## 2021-04-04 PROCEDURE — 82962 GLUCOSE BLOOD TEST: CPT

## 2021-04-04 PROCEDURE — 63600175 PHARM REV CODE 636 W HCPCS: Performed by: SURGERY

## 2021-04-04 RX ADMIN — INSULIN ASPART 2 UNITS: 100 INJECTION, SOLUTION INTRAVENOUS; SUBCUTANEOUS at 12:04

## 2021-04-04 RX ADMIN — FAMOTIDINE 20 MG: 20 TABLET, FILM COATED ORAL at 09:04

## 2021-04-04 RX ADMIN — ENOXAPARIN SODIUM 40 MG: 40 INJECTION SUBCUTANEOUS at 05:04

## 2021-04-04 RX ADMIN — INSULIN ASPART 4 UNITS: 100 INJECTION, SOLUTION INTRAVENOUS; SUBCUTANEOUS at 06:04

## 2021-04-04 RX ADMIN — FAMOTIDINE 20 MG: 20 TABLET, FILM COATED ORAL at 08:04

## 2021-04-04 RX ADMIN — SODIUM CHLORIDE: 9 INJECTION, SOLUTION INTRAVENOUS at 05:04

## 2021-04-04 RX ADMIN — PRAVASTATIN SODIUM 40 MG: 10 TABLET ORAL at 08:04

## 2021-04-04 RX ADMIN — AMPICILLIN SODIUM AND SULBACTAM SODIUM 3 G: 2; 1 INJECTION, POWDER, FOR SOLUTION INTRAMUSCULAR; INTRAVENOUS at 11:04

## 2021-04-04 RX ADMIN — AMPICILLIN SODIUM AND SULBACTAM SODIUM 3 G: 2; 1 INJECTION, POWDER, FOR SOLUTION INTRAMUSCULAR; INTRAVENOUS at 05:04

## 2021-04-04 RX ADMIN — NICOTINE 1 PATCH: 14 PATCH, EXTENDED RELEASE TRANSDERMAL at 09:04

## 2021-04-04 RX ADMIN — AMPICILLIN SODIUM AND SULBACTAM SODIUM 3 G: 2; 1 INJECTION, POWDER, FOR SOLUTION INTRAMUSCULAR; INTRAVENOUS at 01:04

## 2021-04-04 RX ADMIN — INSULIN ASPART 4 UNITS: 100 INJECTION, SOLUTION INTRAVENOUS; SUBCUTANEOUS at 08:04

## 2021-04-04 RX ADMIN — AMPICILLIN SODIUM AND SULBACTAM SODIUM 3 G: 2; 1 INJECTION, POWDER, FOR SOLUTION INTRAMUSCULAR; INTRAVENOUS at 12:04

## 2021-04-04 RX ADMIN — ASPIRIN 81 MG: 81 TABLET, COATED ORAL at 09:04

## 2021-04-04 RX ADMIN — ACETAMINOPHEN 650 MG: 325 TABLET ORAL at 10:04

## 2021-04-04 RX ADMIN — HYDROCODONE BITARTRATE AND ACETAMINOPHEN 1 TABLET: 10; 325 TABLET ORAL at 08:04

## 2021-04-04 RX ADMIN — POLYETHYLENE GLYCOL 3350 17 G: 17 POWDER, FOR SOLUTION ORAL at 09:04

## 2021-04-04 RX ADMIN — METOPROLOL TARTRATE 25 MG: 25 TABLET, FILM COATED ORAL at 08:04

## 2021-04-04 RX ADMIN — LISINOPRIL AND HYDROCHLOROTHIAZIDE 1 TABLET: 25; 20 TABLET ORAL at 10:04

## 2021-04-04 RX ADMIN — METOPROLOL TARTRATE 25 MG: 25 TABLET, FILM COATED ORAL at 09:04

## 2021-04-04 RX ADMIN — INSULIN ASPART 6 UNITS: 100 INJECTION, SOLUTION INTRAVENOUS; SUBCUTANEOUS at 05:04

## 2021-04-04 RX ADMIN — INSULIN DETEMIR 10 UNITS: 100 INJECTION, SOLUTION SUBCUTANEOUS at 08:04

## 2021-04-05 VITALS
SYSTOLIC BLOOD PRESSURE: 111 MMHG | TEMPERATURE: 100 F | BODY MASS INDEX: 31.46 KG/M2 | OXYGEN SATURATION: 94 % | HEART RATE: 79 BPM | RESPIRATION RATE: 18 BRPM | HEIGHT: 66 IN | DIASTOLIC BLOOD PRESSURE: 63 MMHG | WEIGHT: 195.75 LBS

## 2021-04-05 LAB
GLUCOSE SERPL-MCNC: 265 MG/DL (ref 70–105)
GLUCOSE SERPL-MCNC: 275 MG/DL (ref 70–105)

## 2021-04-05 PROCEDURE — S4991 NICOTINE PATCH NONLEGEND: HCPCS | Performed by: SURGERY

## 2021-04-05 PROCEDURE — 25000003 PHARM REV CODE 250: Performed by: SURGERY

## 2021-04-05 PROCEDURE — 97110 THERAPEUTIC EXERCISES: CPT

## 2021-04-05 PROCEDURE — 63600175 PHARM REV CODE 636 W HCPCS: Performed by: SURGERY

## 2021-04-05 PROCEDURE — 82962 GLUCOSE BLOOD TEST: CPT

## 2021-04-05 RX ORDER — HYDROCODONE BITARTRATE AND ACETAMINOPHEN 5; 325 MG/1; MG/1
1 TABLET ORAL EVERY 6 HOURS PRN
Qty: 15 TABLET | Refills: 0 | Status: ON HOLD | OUTPATIENT
Start: 2021-04-05 | End: 2022-07-11 | Stop reason: HOSPADM

## 2021-04-05 RX ORDER — CLOPIDOGREL BISULFATE 75 MG/1
75 TABLET ORAL DAILY
Qty: 30 TABLET | Refills: 11 | Status: ON HOLD | OUTPATIENT
Start: 2021-04-05 | End: 2022-07-07 | Stop reason: CLARIF

## 2021-04-05 RX ADMIN — NICOTINE 1 PATCH: 14 PATCH, EXTENDED RELEASE TRANSDERMAL at 08:04

## 2021-04-05 RX ADMIN — ASPIRIN 81 MG: 81 TABLET, COATED ORAL at 08:04

## 2021-04-05 RX ADMIN — FAMOTIDINE 20 MG: 20 TABLET, FILM COATED ORAL at 08:04

## 2021-04-05 RX ADMIN — LISINOPRIL AND HYDROCHLOROTHIAZIDE 1 TABLET: 25; 20 TABLET ORAL at 08:04

## 2021-04-05 RX ADMIN — INSULIN ASPART 6 UNITS: 100 INJECTION, SOLUTION INTRAVENOUS; SUBCUTANEOUS at 06:04

## 2021-04-05 RX ADMIN — METOPROLOL TARTRATE 25 MG: 25 TABLET, FILM COATED ORAL at 08:04

## 2021-04-05 RX ADMIN — INSULIN ASPART 6 UNITS: 100 INJECTION, SOLUTION INTRAVENOUS; SUBCUTANEOUS at 11:04

## 2021-04-05 RX ADMIN — AMPICILLIN SODIUM AND SULBACTAM SODIUM 3 G: 2; 1 INJECTION, POWDER, FOR SOLUTION INTRAMUSCULAR; INTRAVENOUS at 06:04

## 2021-04-05 RX ADMIN — POLYETHYLENE GLYCOL 3350 17 G: 17 POWDER, FOR SOLUTION ORAL at 08:04

## 2021-04-15 ENCOUNTER — OFFICE VISIT (OUTPATIENT)
Dept: SURGERY | Facility: CLINIC | Age: 61
End: 2021-04-15

## 2021-04-15 VITALS — BODY MASS INDEX: 27.32 KG/M2 | WEIGHT: 170 LBS | OXYGEN SATURATION: 99 % | HEART RATE: 63 BPM | HEIGHT: 66 IN

## 2021-04-15 DIAGNOSIS — I73.9 PVD (PERIPHERAL VASCULAR DISEASE): Primary | ICD-10-CM

## 2021-04-15 PROCEDURE — 99999 PR PBB SHADOW E&M-EST. PATIENT-LVL IV: ICD-10-PCS | Mod: PBBFAC,,, | Performed by: SURGERY

## 2021-04-15 PROCEDURE — 99999 PR PBB SHADOW E&M-EST. PATIENT-LVL IV: CPT | Mod: PBBFAC,,, | Performed by: SURGERY

## 2021-04-15 PROCEDURE — 99024 POSTOP FOLLOW-UP VISIT: CPT | Mod: ,,, | Performed by: SURGERY

## 2021-04-15 PROCEDURE — 99214 OFFICE O/P EST MOD 30 MIN: CPT | Mod: PBBFAC | Performed by: SURGERY

## 2021-04-15 PROCEDURE — 99024 PR POST-OP FOLLOW-UP VISIT: ICD-10-PCS | Mod: ,,, | Performed by: SURGERY

## 2022-07-07 ENCOUNTER — HOSPITAL ENCOUNTER (INPATIENT)
Facility: HOSPITAL | Age: 62
LOS: 4 days | Discharge: HOME HEALTH | DRG: 256 | End: 2022-07-11
Attending: FAMILY MEDICINE | Admitting: INTERNAL MEDICINE
Payer: OTHER GOVERNMENT

## 2022-07-07 DIAGNOSIS — R07.9 CHEST PAIN: ICD-10-CM

## 2022-07-07 DIAGNOSIS — L08.9 WOUND INFECTION: ICD-10-CM

## 2022-07-07 DIAGNOSIS — I10 ESSENTIAL HYPERTENSION: ICD-10-CM

## 2022-07-07 DIAGNOSIS — I25.10 CORONARY ARTERY DISEASE INVOLVING NATIVE CORONARY ARTERY OF NATIVE HEART WITHOUT ANGINA PECTORIS: ICD-10-CM

## 2022-07-07 DIAGNOSIS — E11.65 UNCONTROLLED TYPE 2 DIABETES MELLITUS WITH HYPERGLYCEMIA: ICD-10-CM

## 2022-07-07 DIAGNOSIS — Z72.0 TOBACCO ABUSE: ICD-10-CM

## 2022-07-07 DIAGNOSIS — T14.8XXA WOUND INFECTION: ICD-10-CM

## 2022-07-07 DIAGNOSIS — I73.9 PAD (PERIPHERAL ARTERY DISEASE): ICD-10-CM

## 2022-07-07 DIAGNOSIS — L03.116 CELLULITIS OF LEFT LOWER EXTREMITY: ICD-10-CM

## 2022-07-07 DIAGNOSIS — I96 GANGRENOUS TOE: Primary | ICD-10-CM

## 2022-07-07 DIAGNOSIS — E87.1 HYPONATREMIA: ICD-10-CM

## 2022-07-07 PROBLEM — L03.115 CELLULITIS OF RIGHT LOWER EXTREMITY: Status: ACTIVE | Noted: 2022-07-07

## 2022-07-07 PROBLEM — L03.115 CELLULITIS OF RIGHT LOWER EXTREMITY: Status: RESOLVED | Noted: 2022-07-07 | Resolved: 2022-07-07

## 2022-07-07 LAB
ABORH RETYPE: NORMAL
ALBUMIN SERPL BCP-MCNC: 3.1 G/DL (ref 3.5–5)
ALBUMIN/GLOB SERPL: 0.7 {RATIO}
ALP SERPL-CCNC: 116 U/L (ref 50–130)
ALT SERPL W P-5'-P-CCNC: 15 U/L (ref 13–56)
ANION GAP SERPL CALCULATED.3IONS-SCNC: 14 MMOL/L (ref 7–16)
AST SERPL W P-5'-P-CCNC: 13 U/L (ref 15–37)
BASOPHILS # BLD AUTO: 0.07 K/UL (ref 0–0.2)
BASOPHILS NFR BLD AUTO: 0.6 % (ref 0–1)
BILIRUB SERPL-MCNC: 0.3 MG/DL (ref 0–1.2)
BUN SERPL-MCNC: 6 MG/DL (ref 7–18)
BUN/CREAT SERPL: 8 (ref 6–20)
CALCIUM SERPL-MCNC: 8.9 MG/DL (ref 8.5–10.1)
CHLORIDE SERPL-SCNC: 93 MMOL/L (ref 98–107)
CO2 SERPL-SCNC: 27 MMOL/L (ref 21–32)
CREAT SERPL-MCNC: 0.73 MG/DL (ref 0.55–1.02)
DIFFERENTIAL METHOD BLD: ABNORMAL
EOSINOPHIL # BLD AUTO: 0.1 K/UL (ref 0–0.5)
EOSINOPHIL NFR BLD AUTO: 0.9 % (ref 1–4)
ERYTHROCYTE [DISTWIDTH] IN BLOOD BY AUTOMATED COUNT: 14.5 % (ref 11.5–14.5)
EST. AVERAGE GLUCOSE BLD GHB EST-MCNC: 340 MG/DL
FERRITIN SERPL-MCNC: 194 NG/ML (ref 8–252)
GLOBULIN SER-MCNC: 4.2 G/DL (ref 2–4)
GLUCOSE SERPL-MCNC: 197 MG/DL (ref 70–105)
GLUCOSE SERPL-MCNC: 356 MG/DL (ref 74–106)
GLUCOSE SERPL-MCNC: 457 MG/DL (ref 70–105)
HBA1C MFR BLD HPLC: 12.8 % (ref 4.5–6.6)
HCT VFR BLD AUTO: 39.3 % (ref 38–47)
HGB BLD-MCNC: 12.8 G/DL (ref 12–16)
HOLD SPECIMEN: NORMAL
IMM GRANULOCYTES # BLD AUTO: 0.05 K/UL (ref 0–0.04)
IMM GRANULOCYTES NFR BLD: 0.4 % (ref 0–0.4)
INDIRECT COOMBS: NORMAL
INR BLD: 1.05 (ref 0.9–1.1)
IRON SATN MFR SERPL: 23 % (ref 14–50)
IRON SERPL-MCNC: 60 ΜG/DL (ref 50–170)
LYMPHOCYTES # BLD AUTO: 2.7 K/UL (ref 1–4.8)
LYMPHOCYTES NFR BLD AUTO: 23.9 % (ref 27–41)
MCH RBC QN AUTO: 28.3 PG (ref 27–31)
MCHC RBC AUTO-ENTMCNC: 32.6 G/DL (ref 32–36)
MCV RBC AUTO: 86.9 FL (ref 80–96)
MONOCYTES # BLD AUTO: 0.77 K/UL (ref 0–0.8)
MONOCYTES NFR BLD AUTO: 6.8 % (ref 2–6)
MPC BLD CALC-MCNC: 10.7 FL (ref 9.4–12.4)
NEUTROPHILS # BLD AUTO: 7.6 K/UL (ref 1.8–7.7)
NEUTROPHILS NFR BLD AUTO: 67.4 % (ref 53–65)
NRBC # BLD AUTO: 0 X10E3/UL
NRBC, AUTO (.00): 0 %
PLATELET # BLD AUTO: 241 K/UL (ref 150–400)
POTASSIUM SERPL-SCNC: 3.6 MMOL/L (ref 3.5–5.1)
PROT SERPL-MCNC: 7.3 G/DL (ref 6.4–8.2)
PROTHROMBIN TIME: 13.3 SECONDS (ref 11.7–14.7)
RBC # BLD AUTO: 4.52 M/UL (ref 4.2–5.4)
RH BLD: NORMAL
SARS-COV-2 RDRP RESP QL NAA+PROBE: NEGATIVE
SODIUM SERPL-SCNC: 130 MMOL/L (ref 136–145)
TIBC SERPL-MCNC: 258 ΜG/DL (ref 250–450)
WBC # BLD AUTO: 11.29 K/UL (ref 4.5–11)

## 2022-07-07 PROCEDURE — 93010 ELECTROCARDIOGRAM REPORT: CPT | Mod: ,,, | Performed by: HOSPITALIST

## 2022-07-07 PROCEDURE — 85025 COMPLETE CBC W/AUTO DIFF WBC: CPT

## 2022-07-07 PROCEDURE — 82728 ASSAY OF FERRITIN: CPT | Performed by: INTERNAL MEDICINE

## 2022-07-07 PROCEDURE — 11000001 HC ACUTE MED/SURG PRIVATE ROOM

## 2022-07-07 PROCEDURE — 63600175 PHARM REV CODE 636 W HCPCS: Performed by: INTERNAL MEDICINE

## 2022-07-07 PROCEDURE — 99284 EMERGENCY DEPT VISIT MOD MDM: CPT | Mod: ,,, | Performed by: FAMILY MEDICINE

## 2022-07-07 PROCEDURE — 99223 1ST HOSP IP/OBS HIGH 75: CPT | Mod: ,,, | Performed by: INTERNAL MEDICINE

## 2022-07-07 PROCEDURE — 25000003 PHARM REV CODE 250: Performed by: INTERNAL MEDICINE

## 2022-07-07 PROCEDURE — 99284 PR EMERGENCY DEPT VISIT,LEVEL IV: ICD-10-PCS | Mod: ,,, | Performed by: FAMILY MEDICINE

## 2022-07-07 PROCEDURE — 96375 TX/PRO/DX INJ NEW DRUG ADDON: CPT

## 2022-07-07 PROCEDURE — 82962 GLUCOSE BLOOD TEST: CPT

## 2022-07-07 PROCEDURE — 36415 COLL VENOUS BLD VENIPUNCTURE: CPT

## 2022-07-07 PROCEDURE — 99285 EMERGENCY DEPT VISIT HI MDM: CPT | Mod: 25

## 2022-07-07 PROCEDURE — 93005 ELECTROCARDIOGRAM TRACING: CPT

## 2022-07-07 PROCEDURE — 25000003 PHARM REV CODE 250

## 2022-07-07 PROCEDURE — 93010 EKG 12-LEAD: ICD-10-PCS | Mod: ,,, | Performed by: HOSPITALIST

## 2022-07-07 PROCEDURE — 85610 PROTHROMBIN TIME: CPT

## 2022-07-07 PROCEDURE — 80053 COMPREHEN METABOLIC PANEL: CPT

## 2022-07-07 PROCEDURE — 63600175 PHARM REV CODE 636 W HCPCS

## 2022-07-07 PROCEDURE — 87040 BLOOD CULTURE FOR BACTERIA: CPT

## 2022-07-07 PROCEDURE — 96366 THER/PROPH/DIAG IV INF ADDON: CPT

## 2022-07-07 PROCEDURE — 25500020 PHARM REV CODE 255: Performed by: FAMILY MEDICINE

## 2022-07-07 PROCEDURE — 83036 HEMOGLOBIN GLYCOSYLATED A1C: CPT

## 2022-07-07 PROCEDURE — 96365 THER/PROPH/DIAG IV INF INIT: CPT

## 2022-07-07 PROCEDURE — 99223 PR INITIAL HOSPITAL CARE,LEVL III: ICD-10-PCS | Mod: ,,, | Performed by: INTERNAL MEDICINE

## 2022-07-07 PROCEDURE — 86850 RBC ANTIBODY SCREEN: CPT

## 2022-07-07 PROCEDURE — 87635 SARS-COV-2 COVID-19 AMP PRB: CPT

## 2022-07-07 PROCEDURE — 83540 ASSAY OF IRON: CPT | Performed by: INTERNAL MEDICINE

## 2022-07-07 RX ORDER — HYDROCHLOROTHIAZIDE 25 MG/1
25 TABLET ORAL DAILY
Status: DISCONTINUED | OUTPATIENT
Start: 2022-07-08 | End: 2022-07-07

## 2022-07-07 RX ORDER — IBUPROFEN 200 MG
24 TABLET ORAL
Status: DISCONTINUED | OUTPATIENT
Start: 2022-07-07 | End: 2022-07-11 | Stop reason: HOSPADM

## 2022-07-07 RX ORDER — LISINOPRIL AND HYDROCHLOROTHIAZIDE 20; 25 MG/1; MG/1
1 TABLET ORAL DAILY
Status: DISCONTINUED | OUTPATIENT
Start: 2022-07-08 | End: 2022-07-07

## 2022-07-07 RX ORDER — GLUCAGON 1 MG
1 KIT INJECTION
Status: DISCONTINUED | OUTPATIENT
Start: 2022-07-07 | End: 2022-07-09

## 2022-07-07 RX ORDER — INSULIN ASPART 100 [IU]/ML
1-10 INJECTION, SOLUTION INTRAVENOUS; SUBCUTANEOUS EVERY 4 HOURS PRN
Status: DISCONTINUED | OUTPATIENT
Start: 2022-07-07 | End: 2022-07-09

## 2022-07-07 RX ORDER — ONDANSETRON 2 MG/ML
4 INJECTION INTRAMUSCULAR; INTRAVENOUS EVERY 8 HOURS PRN
Status: DISCONTINUED | OUTPATIENT
Start: 2022-07-07 | End: 2022-07-11 | Stop reason: HOSPADM

## 2022-07-07 RX ORDER — ASPIRIN 81 MG/1
81 TABLET ORAL DAILY
Status: DISCONTINUED | OUTPATIENT
Start: 2022-07-08 | End: 2022-07-11 | Stop reason: HOSPADM

## 2022-07-07 RX ORDER — SODIUM CHLORIDE 9 MG/ML
INJECTION, SOLUTION INTRAVENOUS CONTINUOUS
Status: DISCONTINUED | OUTPATIENT
Start: 2022-07-07 | End: 2022-07-11 | Stop reason: HOSPADM

## 2022-07-07 RX ORDER — IBUPROFEN 200 MG
16 TABLET ORAL
Status: DISCONTINUED | OUTPATIENT
Start: 2022-07-07 | End: 2022-07-11 | Stop reason: HOSPADM

## 2022-07-07 RX ORDER — ENOXAPARIN SODIUM 100 MG/ML
40 INJECTION SUBCUTANEOUS EVERY 24 HOURS
Status: DISCONTINUED | OUTPATIENT
Start: 2022-07-08 | End: 2022-07-11 | Stop reason: HOSPADM

## 2022-07-07 RX ORDER — SODIUM CHLORIDE 0.9 % (FLUSH) 0.9 %
10 SYRINGE (ML) INJECTION EVERY 12 HOURS PRN
Status: DISCONTINUED | OUTPATIENT
Start: 2022-07-07 | End: 2022-07-11 | Stop reason: HOSPADM

## 2022-07-07 RX ORDER — ATORVASTATIN CALCIUM 40 MG/1
40 TABLET, FILM COATED ORAL DAILY
Status: DISCONTINUED | OUTPATIENT
Start: 2022-07-08 | End: 2022-07-11 | Stop reason: HOSPADM

## 2022-07-07 RX ORDER — NALOXONE HCL 0.4 MG/ML
0.02 VIAL (ML) INJECTION
Status: DISCONTINUED | OUTPATIENT
Start: 2022-07-07 | End: 2022-07-11 | Stop reason: HOSPADM

## 2022-07-07 RX ORDER — FAMOTIDINE 20 MG/1
20 TABLET, FILM COATED ORAL 2 TIMES DAILY
Status: DISCONTINUED | OUTPATIENT
Start: 2022-07-07 | End: 2022-07-11 | Stop reason: HOSPADM

## 2022-07-07 RX ORDER — HYDROCODONE BITARTRATE AND ACETAMINOPHEN 7.5; 325 MG/1; MG/1
1 TABLET ORAL EVERY 6 HOURS PRN
Status: DISCONTINUED | OUTPATIENT
Start: 2022-07-07 | End: 2022-07-11 | Stop reason: HOSPADM

## 2022-07-07 RX ORDER — MORPHINE SULFATE 2 MG/ML
2 INJECTION, SOLUTION INTRAMUSCULAR; INTRAVENOUS EVERY 4 HOURS PRN
Status: DISCONTINUED | OUTPATIENT
Start: 2022-07-07 | End: 2022-07-11 | Stop reason: HOSPADM

## 2022-07-07 RX ORDER — LISINOPRIL 20 MG/1
20 TABLET ORAL DAILY
Status: DISCONTINUED | OUTPATIENT
Start: 2022-07-08 | End: 2022-07-11

## 2022-07-07 RX ADMIN — FAMOTIDINE 20 MG: 20 TABLET, FILM COATED ORAL at 09:07

## 2022-07-07 RX ADMIN — SODIUM CHLORIDE 1000 ML: 9 INJECTION, SOLUTION INTRAVENOUS at 05:07

## 2022-07-07 RX ADMIN — HUMAN INSULIN 5 UNITS: 100 INJECTION, SOLUTION SUBCUTANEOUS at 05:07

## 2022-07-07 RX ADMIN — VANCOMYCIN HYDROCHLORIDE 1500 MG: 1 INJECTION, POWDER, LYOPHILIZED, FOR SOLUTION INTRAVENOUS at 09:07

## 2022-07-07 RX ADMIN — IOPAMIDOL 100 ML: 755 INJECTION, SOLUTION INTRAVENOUS at 08:07

## 2022-07-07 RX ADMIN — MORPHINE SULFATE 2 MG: 2 INJECTION, SOLUTION INTRAMUSCULAR; INTRAVENOUS at 09:07

## 2022-07-07 RX ADMIN — SODIUM CHLORIDE: 9 INJECTION, SOLUTION INTRAVENOUS at 09:07

## 2022-07-07 RX ADMIN — PIPERACILLIN AND TAZOBACTAM 4.5 G: 4; .5 INJECTION, POWDER, FOR SOLUTION INTRAVENOUS at 05:07

## 2022-07-07 NOTE — ED TRIAGE NOTES
PAT[facility] SHE WAS GETTING HER TOE EVALUATED AND WAS SENT HERE FOR FURTHER WORK UP AND R/O SEPSIS. PATIENT HAS NECROTIC GREAT TOE ON LEFT SIDE

## 2022-07-07 NOTE — PROGRESS NOTES
Pharmacy consulted for vancomycin dosing. We will begin vancomycin 1500 mg IV every 24 hours today and check a trough 7/10/22 1930. Pharmacy will monitor daily and adjust as necessary.

## 2022-07-07 NOTE — ED PROVIDER NOTES
Encounter Date: 7/7/2022       History     Chief Complaint   Patient presents with    Diabetic Foot Ulcer     61 year old female with PMHx of diabetes, PVD, and HTN came in with ulcer on the left great toe. Patient reports the ulcer has been there over a year ago and has been progressively getting worse. Patient has PVD and presented with the same ulcer over a year ago to Dr. Alvarado who did a procedure on the patient and placed a stent per patient. Patient is suppose to take plavix but she is not compliant with her medications since she doesn't have insurance. The wound has been getting worse progressively since last year and patient hasnt seen Dr. Alvarado for over a year. Patient presented with tachycardia of 114. The toe looks infected, necrosed and has a foul smell.         Review of patient's allergies indicates:   Allergen Reactions    Levofloxacin      Past Medical History:   Diagnosis Date    Diabetes mellitus     Hypertension      Past Surgical History:   Procedure Laterality Date    CREATION OF FEMOROPOPLITEAL ARTERIAL BYPASS USING GRAFT Left 4/2/2021    Procedure: CREATION, BYPASS, ARTERIAL, FEMORAL TO POPLITEAL, USING GRAFT;  Surgeon: Alisha Alvarado MD;  Location: [facility] OR;  Service: General;  Laterality: Left;    TOE AMPUTATION Left 7/8/2022    Procedure: left graet and 2nd toe;  Surgeon: Mj Rivera MD;  Location: [facility] OR;  Service: General;  Laterality: Left;     Family History   Problem Relation Age of Onset    Hypertension Mother     Heart disease Father     Hypertension Father      Social History     Tobacco Use    Smoking status: Current Every Day Smoker     Packs/day: 2.00    Smokeless tobacco: Former User   Substance Use Topics    Alcohol use: Never    Drug use: Never     Review of Systems   Constitutional: Negative for activity change, chills, fatigue and fever.   Respiratory: Negative for cough, choking, chest tightness, shortness of breath and stridor.    Cardiovascular:  Negative for chest pain, palpitations and leg swelling.   Gastrointestinal: Negative for abdominal distention and abdominal pain.   Musculoskeletal: Negative for arthralgias, back pain, gait problem and joint swelling.   Skin: Positive for color change and wound. Negative for pallor and rash.   Neurological: Negative for dizziness, syncope, light-headedness, numbness and headaches.   Hematological: Negative for adenopathy.   Psychiatric/Behavioral: Negative for agitation.       Physical Exam     Initial Vitals [07/07/22 1608]   BP Pulse Resp Temp SpO2   (!) 111/58 (!) 114 20 97.9 °F (36.6 °C) 97 %      MAP       --         Physical Exam    Nursing note and vitals reviewed.  Constitutional: She appears well-developed and well-nourished. No distress.   HENT:   Head: Normocephalic and atraumatic.   Eyes: Conjunctivae are normal. Right eye exhibits no discharge. Left eye exhibits no discharge.   Neck: Neck supple.   Cardiovascular: Normal rate, regular rhythm and normal heart sounds.   No murmur heard.  Pulmonary/Chest: Breath sounds normal. No respiratory distress. She has no wheezes. She has no rales.   Abdominal: Abdomen is soft. Bowel sounds are normal. She exhibits no distension.   Musculoskeletal:         General: No tenderness or edema.      Cervical back: Neck supple.     Neurological: She is oriented to person, place, and time.   Skin: Skin is warm. No erythema.   Psychiatric: She has a normal mood and affect.         Medical Screening Exam   See Full Note    ED Course   Procedures  Labs Reviewed   COMPREHENSIVE METABOLIC PANEL - Abnormal; Notable for the following components:       Result Value    Sodium 130 (*)     Chloride 93 (*)     Glucose 356 (*)     BUN 6 (*)     Albumin 3.1 (*)     Globulin 4.2 (*)     AST 13 (*)     All other components within normal limits   CBC WITH DIFFERENTIAL - Abnormal; Notable for the following components:    WBC 11.29 (*)     Neutrophils % 67.4 (*)     Lymphocytes % 23.9 (*)      Monocytes % 6.8 (*)     Eosinophils % 0.9 (*)     Immature Granulocytes, Absolute 0.05 (*)     All other components within normal limits   HEMOGLOBIN A1C - Abnormal; Notable for the following components:    Hemoglobin A1C 12.8 (*)     All other components within normal limits   POCT GLUCOSE MONITORING CONTINUOUS - Abnormal; Notable for the following components:    POC Glucose 457 (*)     All other components within normal limits   POCT GLUCOSE MONITORING CONTINUOUS - Abnormal; Notable for the following components:    POC Glucose 197 (*)     All other components within normal limits   SARS-COV-2 RNA AMPLIFICATION, QUAL - Normal    Narrative:     Negative SARS-CoV results should not be used as the sole basis for treatment or patient management decisions; negative results should be considered in the context of a patient's recent exposures, history and the presene of clinical signs and symptoms consistent with COVID-19.  Negative results should be treated as presumptive and confirmed by molecular assay, if necessary for patient management.   PROTIME-INR - Normal   CBC W/ AUTO DIFFERENTIAL    Narrative:     The following orders were created for panel order CBC Auto Differential.  Procedure                               Abnormality         Status                     ---------                               -----------         ------                     CBC with Differential[[id number]]        Abnormal            Final result                 Please view results for these tests on the individual orders.   LAVENDER TOP HOLD   EXTRA TUBES    Narrative:     The following orders were created for panel order EXTRA TUBES.  Procedure                               Abnormality         Status                     ---------                               -----------         ------                     Light Green Top Hold[[id number]]                             In process                 Lavender Top Hold[[id number]]                                 Final result                 Please view results for these tests on the individual orders.   LIGHT GREEN TOP HOLD   TYPE & SCREEN   ABORH RETYPE        ECG Results          EKG 12-lead (Final result)  Result time 07/10/22 18:45:40    Final result by Interface, Lab In [facility] (07/10/22 18:45:40)                 Narrative:    Test Reason : T14.8XXA,L08.9,    Vent. Rate : 108 BPM     Atrial Rate : 108 BPM     P-R Int : 158 ms          QRS Dur : 084 ms      QT Int : 360 ms       P-R-T Axes : 081 077 069 degrees     QTc Int : 482 ms    Sinus tachycardia  Otherwise normal ECG  When compared with ECG of 31-MAR-2021 18:56,  No significant change was found  Confirmed by Cas KENDALL, Catalino GOMEZ ([id number]) on 7/10/2022 6:45:37 PM    Referred By: [email or web address]   SELF           Confirmed By:Catalino Cardozo MD                            Imaging Results          CTA Runoff ABD PEL Bilat Lower Ext (Final result)  Result time 07/08/22 08:37:01    Final result by Tab Nunez MD (07/08/22 08:37:01)                 Impression:      Moderate atherosclerotic disease as detailed.  There is a femoral to popliteal bypass graft that is patent on the left new since prior CT angiogram 03/31/2021.  The anterior tibial arteries are poorly opacified and both lower extremities.  The posterior tibial arteries are patent to the ankle bilaterally.      Electronically signed by: Tab Nunez  Date:    07/08/2022  Time:    08:37             Narrative:    EXAMINATION:  CTA RUNOFF ABD PEL BILAT LOWER EXT    CLINICAL HISTORY:  Peripheral arterial disease status post fem-pop bypass;    TECHNIQUE:  CT angiogram of the abdomen and pelvis with runoff through the bilateral lower extremities.  3D MIP reconstructions were created and reviewed.  125 mL Isovue 370 utilized.    COMPARISON:  Arterial ultrasound prior day    FINDINGS:  Lung bases are clear.    Liver and gallbladder unremarkable.  Adrenals and kidneys appear normal.  Spleen and pancreas  unremarkable.    There is no pneumoperitoneum.  No ascites.    No adenopathy.    No bowel obstruction acute bowel abnormality.  Mild to moderate colonic stool.    Urinary bladder is unremarkable.    There is no acute fracture detected.    CTA: Scattered calcified and noncalcified plaque of the lower thoracic aorta without enlargement.  There is mild degree of plaque seen at the origins of the celiac, superior mesenteric, renal arteries, and the inferior mesenteric artery without vascular occlusion.  The abdominal aorta is normal in caliber.    There is calcified and noncalcified plaque of the common, internal, and external iliac arteries.  Moderate to mild narrowing of both external iliac arteries.    There is calcified and noncalcified plaque of the right common femoral artery and multifocal plaque seen of the right superficial femoral artery.  Mild plaque of the right popliteal artery.  Trifurcation appears patent with multifocal narrowing seen of the posterior and anterior tibial artery.  The posterior tibial artery remains patent at the ankle.  The anterior tibial artery is not opacified at the distal ankle.  The fibular artery is poorly opacified throughout the course.    Calcified and noncalcified plaque left common femoral artery.  There is a superficial femoral artery bypass that is patent.  The left popliteal artery is patent with multifocal narrowing.  Trifurcation is patent.  The anterior tibial artery is poorly opacified in the leg and not seen at the ankle.  The posterior tibial artery is patent to the ankle.  Fibular artery is patent through most of the leg and almost to the level the ankle..                               US Lower Extrem Arteries Bilat with RAHEEM (xpd) (Edited Result - FINAL)  Result time 07/08/22 08:59:47    Addendum 1 of 1 by Eliu Schmitz MD (07/08/22 08:59:47)      Ankle brachial indices were also obtained.  Right ankle brachial index measures 0.59.  Left ankle brachial index  measures 0.55.  Measurements are consistent with moderate arterial disease    Ankle-brachial index interpretation:    Above 0.90: Normal    0.71 - 0.90: Mild obstruction    0.41 - 0.70: Moderate obstruction    0.00 - 0.40: Severe obstruction      Electronically signed by: Eliu Schmitz  Date:    07/08/2022  Time:    08:59                 Final result by Chepe Gardner MD (07/07/22 21:13:53)                 Impression:      No vascular occlusions.  Monophasic waveforms and or weakly biphasic waveforms throughout the bilateral lower extremities with suggestion of multifocal atherosclerotic changes and possible moderate luminal stenosis within the proximal right superficial femoral artery and distal left popliteal artery as detailed above.    Correlate with clinical complaints and consider further evaluation with CT angiography of bilateral lower extremity runoff study as clinically warranted.    The Ultrasound images were captured and stored.    Place of service: [facility].      Electronically signed by: Chepe Gardner  Date:    07/07/2022  Time:    21:13             Narrative:    EXAMINATION:  US ARTERIAL LOWER EXTREMITY BILAT WITH RAHEEM (XPD)    CLINICAL HISTORY:  PVD; history of prior arterial graft/stent 2021    TECHNIQUE:  Grayscale and color flow images of both lower extremities were obtained. Blood pressures were not obtained with this study.    COMPARISON:  Prior ultrasound March 31, 2021    FINDINGS:  Peak systolic arterial velocities are as follows:    Right CFA: 118 cm/s    Right profundus: 74 cm/s    Right SFA proximal: 154 cm/s    Right SFA mid: 134 cm/s    Right SFA distal: 110 cm/s    Right popliteal proximal: 37 cm/s    Right popliteal mid: 70 cm/s    Right popliteal distal: 77 cm/s    Right PTA: 26 cm/s    Right DPA: Ex 26 cm/s    Left CFA: 63 cm/s    Left profundus: 53 cm/s    Left SFA proximal: 59 cm/s    Left SFA mid: 53 cm/s    Left SFA distal: 49 cm/s    Left popliteal proximal: 81  cm/s    Left Popliteal mid: 78 cm/s    Left popliteal distal: 86 cm/s    Left PTA: 33 cm/s    Left DPA: 24 cm/s    No vascular occlusions.  There are predominantly monophasic and or weakly biphasic waveforms throughout the bilateral lower extremities.  Increased velocities are noted at the proximal right superficial femoral and distal left popliteal artery suggestive of at least moderate underlying luminal stenosis.                               X-Ray Chest AP Portable (Final result)  Result time 07/07/22 18:43:26    Final result by Cheep Gardner MD (07/07/22 18:43:26)                 Impression:      No acute cardiopulmonary process.    [place] [facility] [place]      Electronically signed by: Chepe Gardner  Date:    07/07/2022  Time:    18:43             Narrative:    EXAMINATION:  XR CHEST AP PORTABLE    CLINICAL HISTORY:  pre operation;    COMPARISON:  03/31/2021    FINDINGS:  The cardiomediastinal silhouette is within normal limits. The lungs are clear. There is no pneumothorax or pleural effusion.    There is no acute osseous or soft tissue abnormality.                               X-Ray Toe 2 or More Views Left (Final result)  Result time 07/07/22 17:24:34    Final result by Moncho Bloom DO (07/07/22 17:24:34)                 Impression:      As above.    [place] [facility] [place]      Electronically signed by: Moncho Bloom  Date:    07/07/2022  Time:    17:24             Narrative:    EXAMINATION:  XR TOE 2 OR MORE VIEWS LEFT    CLINICAL HISTORY:  infection;    COMPARISON:  None    TECHNIQUE:  Frontal, lateral, and oblique views of the 1st digit of left foot..    FINDINGS:  There is scattered subcutaneous emphysema about the 1st digit suspicious for gas-forming infection.  There is scattered osteopenia.  It would be difficult to exclude subtle osseous erosive change of the 1st digit.  The 1st metatarsal is somewhat shortened on a congenital basis.  Soft tissue  swelling about the forefoot.                                 Medications   insulin regular injection 5 Units 0.05 mL (5 Units Intravenous Given 7/7/22 1739)   sodium chloride 0.9% bolus 1,000 mL (0 mLs Intravenous Stopped 7/7/22 1837)   iopamidoL (ISOVUE-370) injection 100 mL (100 mLs Intravenous Given 7/7/22 2043)                Attending Attestation:   Physician Attestation Statement for Resident:  As the supervising MD   Physician Attestation Statement: I have personally seen and examined this patient.   I agree with the above history. -:   As the supervising MD I agree with the above PE.    As the supervising MD I agree with the above treatment, course, plan, and disposition.                      Clinical Impression:   Final diagnoses:  [I96] Gangrenous toe (Primary)  [R07.9] Chest pain          ED Disposition Condition    Admit               Sabrina Cali MD  07/20/22 2947

## 2022-07-08 ENCOUNTER — ANESTHESIA EVENT (OUTPATIENT)
Dept: SURGERY | Facility: HOSPITAL | Age: 62
DRG: 256 | End: 2022-07-08
Payer: OTHER GOVERNMENT

## 2022-07-08 ENCOUNTER — ANESTHESIA (OUTPATIENT)
Dept: SURGERY | Facility: HOSPITAL | Age: 62
DRG: 256 | End: 2022-07-08
Payer: OTHER GOVERNMENT

## 2022-07-08 LAB
ABORH RETYPE: NORMAL
ALBUMIN SERPL BCP-MCNC: 2.7 G/DL (ref 3.5–5)
ALBUMIN/GLOB SERPL: 0.8 {RATIO}
ALP SERPL-CCNC: 99 U/L (ref 50–130)
ALT SERPL W P-5'-P-CCNC: 11 U/L (ref 13–56)
ANION GAP SERPL CALCULATED.3IONS-SCNC: 10 MMOL/L (ref 7–16)
AST SERPL W P-5'-P-CCNC: 5 U/L (ref 15–37)
BASOPHILS # BLD AUTO: 0.04 K/UL (ref 0–0.2)
BASOPHILS NFR BLD AUTO: 0.4 % (ref 0–1)
BILIRUB SERPL-MCNC: 0.5 MG/DL (ref 0–1.2)
BUN SERPL-MCNC: 4 MG/DL (ref 7–18)
BUN/CREAT SERPL: 8 (ref 6–20)
CALCIUM SERPL-MCNC: 8.7 MG/DL (ref 8.5–10.1)
CHLORIDE SERPL-SCNC: 99 MMOL/L (ref 98–107)
CO2 SERPL-SCNC: 30 MMOL/L (ref 21–32)
CREAT SERPL-MCNC: 0.49 MG/DL (ref 0.55–1.02)
DIFFERENTIAL METHOD BLD: ABNORMAL
EOSINOPHIL # BLD AUTO: 0.13 K/UL (ref 0–0.5)
EOSINOPHIL NFR BLD AUTO: 1.3 % (ref 1–4)
ERYTHROCYTE [DISTWIDTH] IN BLOOD BY AUTOMATED COUNT: 14.6 % (ref 11.5–14.5)
GLOBULIN SER-MCNC: 3.6 G/DL (ref 2–4)
GLUCOSE SERPL-MCNC: 124 MG/DL (ref 74–106)
GLUCOSE SERPL-MCNC: 174 MG/DL (ref 70–105)
GLUCOSE SERPL-MCNC: 220 MG/DL (ref 70–105)
GLUCOSE SERPL-MCNC: 235 MG/DL (ref 70–105)
GLUCOSE SERPL-MCNC: 299 MG/DL (ref 70–105)
GLUCOSE SERPL-MCNC: 320 MG/DL (ref 70–105)
HCT VFR BLD AUTO: 36.7 % (ref 38–47)
HGB BLD-MCNC: 12 G/DL (ref 12–16)
IMM GRANULOCYTES # BLD AUTO: 0.02 K/UL (ref 0–0.04)
IMM GRANULOCYTES NFR BLD: 0.2 % (ref 0–0.4)
LYMPHOCYTES # BLD AUTO: 2.3 K/UL (ref 1–4.8)
LYMPHOCYTES NFR BLD AUTO: 23.3 % (ref 27–41)
MCH RBC QN AUTO: 28.3 PG (ref 27–31)
MCHC RBC AUTO-ENTMCNC: 32.7 G/DL (ref 32–36)
MCV RBC AUTO: 86.6 FL (ref 80–96)
MONOCYTES # BLD AUTO: 1.04 K/UL (ref 0–0.8)
MONOCYTES NFR BLD AUTO: 10.5 % (ref 2–6)
MPC BLD CALC-MCNC: 11 FL (ref 9.4–12.4)
NEUTROPHILS # BLD AUTO: 6.35 K/UL (ref 1.8–7.7)
NEUTROPHILS NFR BLD AUTO: 64.3 % (ref 53–65)
NRBC # BLD AUTO: 0 X10E3/UL
NRBC, AUTO (.00): 0 %
PLATELET # BLD AUTO: 224 K/UL (ref 150–400)
POTASSIUM SERPL-SCNC: 3.3 MMOL/L (ref 3.5–5.1)
PROT SERPL-MCNC: 6.3 G/DL (ref 6.4–8.2)
RBC # BLD AUTO: 4.24 M/UL (ref 4.2–5.4)
SODIUM SERPL-SCNC: 136 MMOL/L (ref 136–145)
WBC # BLD AUTO: 9.88 K/UL (ref 4.5–11)

## 2022-07-08 PROCEDURE — 25000003 PHARM REV CODE 250: Performed by: SURGERY

## 2022-07-08 PROCEDURE — 25000003 PHARM REV CODE 250: Performed by: NURSE ANESTHETIST, CERTIFIED REGISTERED

## 2022-07-08 PROCEDURE — 71000033 HC RECOVERY, INTIAL HOUR: Performed by: SURGERY

## 2022-07-08 PROCEDURE — 37000008 HC ANESTHESIA 1ST 15 MINUTES: Performed by: SURGERY

## 2022-07-08 PROCEDURE — 27000177 HC AIRWAY, LARYNGEAL MASK: Performed by: ANESTHESIOLOGY

## 2022-07-08 PROCEDURE — 28820 AMPUTATION OF TOE: CPT | Mod: TA,T1,, | Performed by: SURGERY

## 2022-07-08 PROCEDURE — 63600175 PHARM REV CODE 636 W HCPCS: Performed by: INTERNAL MEDICINE

## 2022-07-08 PROCEDURE — 63600175 PHARM REV CODE 636 W HCPCS

## 2022-07-08 PROCEDURE — 37000009 HC ANESTHESIA EA ADD 15 MINS: Performed by: SURGERY

## 2022-07-08 PROCEDURE — 80053 COMPREHEN METABOLIC PANEL: CPT | Performed by: INTERNAL MEDICINE

## 2022-07-08 PROCEDURE — D9220A PRA ANESTHESIA: Mod: ANES,,, | Performed by: ANESTHESIOLOGY

## 2022-07-08 PROCEDURE — 25000003 PHARM REV CODE 250: Performed by: FAMILY MEDICINE

## 2022-07-08 PROCEDURE — 27000716 HC OXISENSOR PROBE, ANY SIZE: Performed by: ANESTHESIOLOGY

## 2022-07-08 PROCEDURE — 11000001 HC ACUTE MED/SURG PRIVATE ROOM

## 2022-07-08 PROCEDURE — 27000655: Performed by: ANESTHESIOLOGY

## 2022-07-08 PROCEDURE — 27201423 OPTIME MED/SURG SUP & DEVICES STERILE SUPPLY: Performed by: SURGERY

## 2022-07-08 PROCEDURE — 63600175 PHARM REV CODE 636 W HCPCS: Performed by: SURGERY

## 2022-07-08 PROCEDURE — D9220A PRA ANESTHESIA: Mod: CRNA,,, | Performed by: NURSE ANESTHETIST, CERTIFIED REGISTERED

## 2022-07-08 PROCEDURE — 99232 PR SUBSEQUENT HOSPITAL CARE,LEVL II: ICD-10-PCS | Mod: ,,, | Performed by: FAMILY MEDICINE

## 2022-07-08 PROCEDURE — 63600175 PHARM REV CODE 636 W HCPCS: Performed by: NURSE ANESTHETIST, CERTIFIED REGISTERED

## 2022-07-08 PROCEDURE — 87653 STREP B DNA AMP PROBE: CPT | Performed by: SURGERY

## 2022-07-08 PROCEDURE — D9220A PRA ANESTHESIA: ICD-10-PCS | Mod: CRNA,,, | Performed by: NURSE ANESTHETIST, CERTIFIED REGISTERED

## 2022-07-08 PROCEDURE — 27000510 HC BLANKET BAIR HUGGER ANY SIZE: Performed by: ANESTHESIOLOGY

## 2022-07-08 PROCEDURE — 28820 PR AMPUTATION TOE,MT-P JT: ICD-10-PCS | Mod: TA,T1,, | Performed by: SURGERY

## 2022-07-08 PROCEDURE — 85025 COMPLETE CBC W/AUTO DIFF WBC: CPT | Performed by: INTERNAL MEDICINE

## 2022-07-08 PROCEDURE — 36415 COLL VENOUS BLD VENIPUNCTURE: CPT | Performed by: INTERNAL MEDICINE

## 2022-07-08 PROCEDURE — 87070 CULTURE OTHR SPECIMN AEROBIC: CPT | Performed by: SURGERY

## 2022-07-08 PROCEDURE — D9220A PRA ANESTHESIA: ICD-10-PCS | Mod: ANES,,, | Performed by: ANESTHESIOLOGY

## 2022-07-08 PROCEDURE — 99232 SBSQ HOSP IP/OBS MODERATE 35: CPT | Mod: ,,, | Performed by: FAMILY MEDICINE

## 2022-07-08 PROCEDURE — 25000003 PHARM REV CODE 250

## 2022-07-08 PROCEDURE — 87075 CULTR BACTERIA EXCEPT BLOOD: CPT | Performed by: SURGERY

## 2022-07-08 PROCEDURE — S4991 NICOTINE PATCH NONLEGEND: HCPCS | Performed by: FAMILY MEDICINE

## 2022-07-08 PROCEDURE — 36000707: Performed by: SURGERY

## 2022-07-08 PROCEDURE — 36000706: Performed by: SURGERY

## 2022-07-08 PROCEDURE — 82962 GLUCOSE BLOOD TEST: CPT

## 2022-07-08 RX ORDER — IBUPROFEN 200 MG
1 TABLET ORAL DAILY
Status: DISCONTINUED | OUTPATIENT
Start: 2022-07-08 | End: 2022-07-11 | Stop reason: HOSPADM

## 2022-07-08 RX ORDER — HYDROMORPHONE HYDROCHLORIDE 2 MG/ML
0.5 INJECTION, SOLUTION INTRAMUSCULAR; INTRAVENOUS; SUBCUTANEOUS EVERY 5 MIN PRN
Status: DISCONTINUED | OUTPATIENT
Start: 2022-07-08 | End: 2022-07-08

## 2022-07-08 RX ORDER — IPRATROPIUM BROMIDE AND ALBUTEROL SULFATE 2.5; .5 MG/3ML; MG/3ML
3 SOLUTION RESPIRATORY (INHALATION)
Status: DISCONTINUED | OUTPATIENT
Start: 2022-07-08 | End: 2022-07-11 | Stop reason: HOSPADM

## 2022-07-08 RX ORDER — OXYCODONE HYDROCHLORIDE 5 MG/1
5 TABLET ORAL
Status: DISCONTINUED | OUTPATIENT
Start: 2022-07-08 | End: 2022-07-08

## 2022-07-08 RX ORDER — LIDOCAINE HYDROCHLORIDE 20 MG/ML
INJECTION, SOLUTION EPIDURAL; INFILTRATION; INTRACAUDAL; PERINEURAL
Status: DISCONTINUED | OUTPATIENT
Start: 2022-07-08 | End: 2022-07-08

## 2022-07-08 RX ORDER — SODIUM CHLORIDE, SODIUM LACTATE, POTASSIUM CHLORIDE, CALCIUM CHLORIDE 600; 310; 30; 20 MG/100ML; MG/100ML; MG/100ML; MG/100ML
125 INJECTION, SOLUTION INTRAVENOUS CONTINUOUS
Status: DISCONTINUED | OUTPATIENT
Start: 2022-07-08 | End: 2022-07-08

## 2022-07-08 RX ORDER — MEPERIDINE HYDROCHLORIDE 25 MG/ML
25 INJECTION INTRAMUSCULAR; INTRAVENOUS; SUBCUTANEOUS EVERY 10 MIN PRN
Status: DISCONTINUED | OUTPATIENT
Start: 2022-07-08 | End: 2022-07-08

## 2022-07-08 RX ORDER — MORPHINE SULFATE 8 MG/ML
4 INJECTION INTRAMUSCULAR; INTRAVENOUS; SUBCUTANEOUS EVERY 5 MIN PRN
Status: DISCONTINUED | OUTPATIENT
Start: 2022-07-08 | End: 2022-07-08

## 2022-07-08 RX ORDER — PROPOFOL 10 MG/ML
VIAL (ML) INTRAVENOUS
Status: DISCONTINUED | OUTPATIENT
Start: 2022-07-08 | End: 2022-07-08

## 2022-07-08 RX ORDER — ONDANSETRON 2 MG/ML
4 INJECTION INTRAMUSCULAR; INTRAVENOUS DAILY PRN
Status: DISCONTINUED | OUTPATIENT
Start: 2022-07-08 | End: 2022-07-08

## 2022-07-08 RX ORDER — PHENYLEPHRINE HYDROCHLORIDE 10 MG/ML
INJECTION INTRAVENOUS
Status: DISCONTINUED | OUTPATIENT
Start: 2022-07-08 | End: 2022-07-08

## 2022-07-08 RX ORDER — MIDAZOLAM HYDROCHLORIDE 1 MG/ML
INJECTION INTRAMUSCULAR; INTRAVENOUS
Status: DISCONTINUED | OUTPATIENT
Start: 2022-07-08 | End: 2022-07-08

## 2022-07-08 RX ORDER — ONDANSETRON 2 MG/ML
INJECTION INTRAMUSCULAR; INTRAVENOUS
Status: DISCONTINUED | OUTPATIENT
Start: 2022-07-08 | End: 2022-07-08

## 2022-07-08 RX ORDER — FENTANYL CITRATE 50 UG/ML
INJECTION, SOLUTION INTRAMUSCULAR; INTRAVENOUS
Status: DISCONTINUED | OUTPATIENT
Start: 2022-07-08 | End: 2022-07-08

## 2022-07-08 RX ORDER — DIPHENHYDRAMINE HYDROCHLORIDE 50 MG/ML
25 INJECTION INTRAMUSCULAR; INTRAVENOUS EVERY 6 HOURS PRN
Status: DISCONTINUED | OUTPATIENT
Start: 2022-07-08 | End: 2022-07-08

## 2022-07-08 RX ADMIN — HYDROCODONE BITARTRATE AND ACETAMINOPHEN 1 TABLET: 7.5; 325 TABLET ORAL at 04:07

## 2022-07-08 RX ADMIN — PIPERACILLIN AND TAZOBACTAM 4.5 G: 4; .5 INJECTION, POWDER, FOR SOLUTION INTRAVENOUS at 12:07

## 2022-07-08 RX ADMIN — INSULIN ASPART 4 UNITS: 100 INJECTION, SOLUTION INTRAVENOUS; SUBCUTANEOUS at 12:07

## 2022-07-08 RX ADMIN — PHENYLEPHRINE HYDROCHLORIDE 100 MCG: 10 INJECTION INTRAVENOUS at 09:07

## 2022-07-08 RX ADMIN — MIDAZOLAM 2 MG: 1 INJECTION INTRAMUSCULAR; INTRAVENOUS at 08:07

## 2022-07-08 RX ADMIN — PROPOFOL 150 MG: 10 INJECTION, EMULSION INTRAVENOUS at 09:07

## 2022-07-08 RX ADMIN — FAMOTIDINE 20 MG: 20 TABLET, FILM COATED ORAL at 11:07

## 2022-07-08 RX ADMIN — MORPHINE SULFATE 2 MG: 2 INJECTION, SOLUTION INTRAMUSCULAR; INTRAVENOUS at 08:07

## 2022-07-08 RX ADMIN — ONDANSETRON 4 MG: 2 INJECTION INTRAMUSCULAR; INTRAVENOUS at 09:07

## 2022-07-08 RX ADMIN — MORPHINE SULFATE 2 MG: 2 INJECTION, SOLUTION INTRAMUSCULAR; INTRAVENOUS at 02:07

## 2022-07-08 RX ADMIN — PIPERACILLIN AND TAZOBACTAM 4.5 G: 4; .5 INJECTION, POWDER, FOR SOLUTION INTRAVENOUS at 04:07

## 2022-07-08 RX ADMIN — VANCOMYCIN HYDROCHLORIDE 1500 MG: 1 INJECTION, POWDER, LYOPHILIZED, FOR SOLUTION INTRAVENOUS at 08:07

## 2022-07-08 RX ADMIN — ASPIRIN 81 MG: 81 TABLET, COATED ORAL at 11:07

## 2022-07-08 RX ADMIN — SODIUM CHLORIDE: 9 INJECTION, SOLUTION INTRAVENOUS at 08:07

## 2022-07-08 RX ADMIN — ENOXAPARIN SODIUM 40 MG: 40 INJECTION SUBCUTANEOUS at 04:07

## 2022-07-08 RX ADMIN — LIDOCAINE HYDROCHLORIDE 50 MG: 20 INJECTION, SOLUTION EPIDURAL; INFILTRATION; INTRACAUDAL; PERINEURAL at 09:07

## 2022-07-08 RX ADMIN — INSULIN ASPART 6 UNITS: 100 INJECTION, SOLUTION INTRAVENOUS; SUBCUTANEOUS at 01:07

## 2022-07-08 RX ADMIN — MORPHINE SULFATE 2 MG: 2 INJECTION, SOLUTION INTRAMUSCULAR; INTRAVENOUS at 11:07

## 2022-07-08 RX ADMIN — INSULIN ASPART 4 UNITS: 100 INJECTION, SOLUTION INTRAVENOUS; SUBCUTANEOUS at 04:07

## 2022-07-08 RX ADMIN — FAMOTIDINE 20 MG: 20 TABLET, FILM COATED ORAL at 08:07

## 2022-07-08 RX ADMIN — PIPERACILLIN AND TAZOBACTAM 4.5 G: 4; .5 INJECTION, POWDER, FOR SOLUTION INTRAVENOUS at 11:07

## 2022-07-08 RX ADMIN — NICOTINE 1 PATCH: 21 PATCH, EXTENDED RELEASE TRANSDERMAL at 01:07

## 2022-07-08 RX ADMIN — FENTANYL CITRATE 100 MCG: 50 INJECTION INTRAMUSCULAR; INTRAVENOUS at 09:07

## 2022-07-08 RX ADMIN — SODIUM CHLORIDE: 9 INJECTION, SOLUTION INTRAVENOUS at 09:07

## 2022-07-08 RX ADMIN — LISINOPRIL 20 MG: 20 TABLET ORAL at 11:07

## 2022-07-08 RX ADMIN — ATORVASTATIN CALCIUM 40 MG: 40 TABLET, FILM COATED ORAL at 11:07

## 2022-07-08 RX ADMIN — INSULIN ASPART 2 UNITS: 100 INJECTION, SOLUTION INTRAVENOUS; SUBCUTANEOUS at 08:07

## 2022-07-08 NOTE — ANESTHESIA PREPROCEDURE EVALUATION
07/08/2022  Leti Oquendo is a 61 y.o., female.      Pre-op Assessment    I have reviewed the Patient Summary Reports.     I have reviewed the Nursing Notes. I have reviewed the NPO Status.   I have reviewed the Medications.     Review of Systems  Anesthesia Hx:  No problems with previous Anesthesia    Social:  No Alcohol Use, Smoker    Hematology/Oncology:  Hematology Normal   Oncology Normal     EENT/Dental:EENT/Dental Normal   Cardiovascular:   Hypertension CAD   PVD PAD Gangrene / toes   Pulmonary:   COPD    Renal/:  Renal/ Normal     Hepatic/GI:  Hepatic/GI Normal    Musculoskeletal:  Musculoskeletal Normal    Neurological:  Neurology Normal    Endocrine:   Diabetes, poorly controlled, type 2  Obesity / BMI > 30  Dermatological:  Skin Normal    Psych:  Psychiatric Normal           Physical Exam  General: Well nourished    Airway:  Mallampati: III / III  Mouth Opening: Normal  TM Distance: > 6 cm  Tongue: Normal  Neck ROM: Normal ROM    Chest/Lungs:  Clear to auscultation, Normal Respiratory Rate    Heart:  Rate: Normal  Rhythm: Regular Rhythm        Anesthesia Plan  Type of Anesthesia, risks & benefits discussed:    Anesthesia Type: Gen Supraglottic Airway  Intra-op Monitoring Plan: Standard ASA Monitors  Post Op Pain Control Plan: multimodal analgesia  Induction:  IV  Informed Consent: Informed consent signed with the Patient and all parties understand the risks and agree with anesthesia plan.  All questions answered. Patient consented to blood products? Yes  ASA Score: 3  Day of Surgery Review of History & Physical: H&P Update referred to the surgeon/provider.I have interviewed and examined the patient. I have reviewed the patient's H&P dated: There are no significant changes. H&P completed by Anesthesiologist.    Ready For Surgery From Anesthesia Perspective.     .

## 2022-07-08 NOTE — ASSESSMENT & PLAN NOTE
Developing wet gangrene, suggested by odor  Plan toe amputation of the great toe, and likely partial amputation of  the 2nd toe to, as well    Risks include nonhealing, cardiac or pulmonary complications, and potential for further surgery.  She understands wishes to proceed.

## 2022-07-08 NOTE — SUBJECTIVE & OBJECTIVE
Interval History: Patient voices no complaints.    Review of Systems   Constitutional:  Negative for diaphoresis, fatigue and fever.   Respiratory:  Negative for shortness of breath.    Cardiovascular:  Negative for chest pain.   Gastrointestinal:  Negative for abdominal distention.   Objective:     Vital Signs (Most Recent):  Temp: 97.9 °F (36.6 °C) (07/08/22 0725)  Pulse: 91 (07/08/22 0725)  Resp: 18 (07/08/22 0725)  BP: 128/66 (07/08/22 0725)  SpO2: (!) 92 % (07/08/22 0725)   Vital Signs (24h Range):  Temp:  [97.9 °F (36.6 °C)-98.9 °F (37.2 °C)] 97.9 °F (36.6 °C)  Pulse:  [] 91  Resp:  [16-21] 18  SpO2:  [92 %-99 %] 92 %  BP: (111-158)/(55-74) 128/66     Weight: 78.5 kg (173 lb)  Body mass index is 27.92 kg/m².    Intake/Output Summary (Last 24 hours) at 7/8/2022 0839  Last data filed at 7/8/2022 0530  Gross per 24 hour   Intake 2195.05 ml   Output --   Net 2195.05 ml      Physical Exam  Vitals and nursing note reviewed.   Constitutional:       Appearance: Normal appearance.   Cardiovascular:      Rate and Rhythm: Regular rhythm.      Heart sounds: Normal heart sounds.   Pulmonary:      Breath sounds: Normal breath sounds.   Skin:     Findings: Lesion present.   Neurological:      Mental Status: She is alert.       Significant Labs: All pertinent labs within the past 24 hours have been reviewed.  BMP:   Recent Labs   Lab 07/08/22 0447   *      K 3.3*   CL 99   CO2 30   BUN 4*   CREATININE 0.49*   CALCIUM 8.7     CBC:   Recent Labs   Lab 07/07/22 1719 07/08/22 0447   WBC 11.29* 9.88   HGB 12.8 12.0   HCT 39.3 36.7*    224     CMP:   Recent Labs   Lab 07/07/22 1719 07/08/22 0447   * 136   K 3.6 3.3*   CL 93* 99   CO2 27 30   * 124*   BUN 6* 4*   CREATININE 0.73 0.49*   CALCIUM 8.9 8.7   PROT 7.3 6.3*   ALBUMIN 3.1* 2.7*   BILITOT 0.3 0.5   ALKPHOS 116 99   AST 13* 5*   ALT 15 11*   ANIONGAP 14 10   EGFRNONAA 86 136     POCT Glucose: No results for input(s): POCTGLUCOSE in  the last 48 hours.    Significant Imaging:  none

## 2022-07-08 NOTE — ASSESSMENT & PLAN NOTE
Patient had fem-pop bypass by  in 3/21.  However, patient has not been compliant with medication as prescribed and still continues to smoke cigarettes    On today's exam, DP and PT were not palpable.    Will proceed with CTA with runoff.  Will consult Dr. Alvarado

## 2022-07-08 NOTE — PROGRESS NOTES
[facility]  Progress Note    Patient Name: Leti Oquendo  MRN: [id number]  Patient Class: IP- Inpatient   Admission Date: 7/7/2022  Length of Stay: 1 days  Attending Physician: Girish Carmona MD  Primary Care Provider: Primary Doctor No        Subjective:     Principal Problem:Gangrenous toe        HPI:  Patient is a 61-year-old female with a history of poorly controlled type 2 diabetes complicated by neuropathic ulcers and peripheral arterial disease status post left fem-pop bypass year ago along with CAD with stress test a year ago revealing mild anterior wall ischemia ( treated with medications only ) who presents to emergency room with progressive worsening of left great toe ulceration for the past year now with 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  Patient stated that she has not followed up with Dr. Alvarado, has not been taking Plavix, and continued to smoke cigarettes.  Patient otherwise denied any fever chills chest pain palpitation PND orthopnea or shortness of breath.  On presentation, patient was tachycardic from vital signs were otherwise stable and patient was afebrile.  Workup in ED was unremarkable other than presence of hyperglycemia with blood glucose of 356. Patient will be admitted for further evaluation and intervention      Overview/Hospital Course:  07/08 OR today with Dr. Rivera for gangrenous left great toe.       Interval History: Patient voices no complaints.    Review of Systems   Constitutional:  Negative for diaphoresis, fatigue and fever.   Respiratory:  Negative for shortness of breath.    Cardiovascular:  Negative for chest pain.   Gastrointestinal:  Negative for abdominal distention.   Objective:     Vital Signs (Most Recent):  Temp: 97.9 °F (36.6 °C) (07/08/22 0725)  Pulse: 91 (07/08/22 0725)  Resp: 18 (07/08/22 0725)  BP: 128/66 (07/08/22 0725)  SpO2: (!) 92 % (07/08/22 0725)   Vital Signs (24h Range):  Temp:   [97.9 °F (36.6 °C)-98.9 °F (37.2 °C)] 97.9 °F (36.6 °C)  Pulse:  [] 91  Resp:  [16-21] 18  SpO2:  [92 %-99 %] 92 %  BP: (111-158)/(55-74) 128/66     Weight: 78.5 kg (173 lb)  Body mass index is 27.92 kg/m².    Intake/Output Summary (Last 24 hours) at 7/8/2022 0839  Last data filed at 7/8/2022 0530  Gross per 24 hour   Intake 2195.05 ml   Output --   Net 2195.05 ml      Physical Exam  Vitals and nursing note reviewed.   Constitutional:       Appearance: Normal appearance.   Cardiovascular:      Rate and Rhythm: Regular rhythm.      Heart sounds: Normal heart sounds.   Pulmonary:      Breath sounds: Normal breath sounds.   Skin:     Findings: Lesion present.   Neurological:      Mental Status: She is alert.       Significant Labs: All pertinent labs within the past 24 hours have been reviewed.  BMP:   Recent Labs   Lab 07/08/22  0447   *      K 3.3*   CL 99   CO2 30   BUN 4*   CREATININE 0.49*   CALCIUM 8.7     CBC:   Recent Labs   Lab 07/07/22  1719 07/08/22  0447   WBC 11.29* 9.88   HGB 12.8 12.0   HCT 39.3 36.7*    224     CMP:   Recent Labs   Lab 07/07/22  1719 07/08/22  0447   * 136   K 3.6 3.3*   CL 93* 99   CO2 27 30   * 124*   BUN 6* 4*   CREATININE 0.73 0.49*   CALCIUM 8.9 8.7   PROT 7.3 6.3*   ALBUMIN 3.1* 2.7*   BILITOT 0.3 0.5   ALKPHOS 116 99   AST 13* 5*   ALT 15 11*   ANIONGAP 14 10   EGFRNONAA 86 136     POCT Glucose: No results for input(s): POCTGLUCOSE in the last 48 hours.    Significant Imaging:  none      Assessment/Plan:      * Gangrenous toe    Patient underwent fem-pop bypass in 3/21 for nonhealing L great toe ulcer, but patient never followed up with Dr. Alvarado.  Has not been compliant with medications including Plavix and continued to smoke cigarettes    Unfortunately, the wound progressively became worse for the past year and patient decided come to the ED this evening for evaluation.    Patient was found to have dry gangrene of left great toe with auto  amputation of distal phalanx.    Patient will require amputation, and Dr. Rivera has been consulted by ED provider      Coronary artery disease involving native coronary artery without angina pectoris    On stress test last year, patient was found to have a mild anterior wall ischemia with normal echocardiogram.  Decision was made to treat this patient with medications only.  However, patient has not been compliant with medication as prescribed.  Currently, patient offered no complaints.  Will resume home medications and monitor    Hyponatremia    Corrected sodium was 134. Will start patient on normal saline at 125 cc an hour      Cellulitis of left lower extremity    Patient was also found to have edematous and erythematous left foot and ankle.  Will empirically start patient on vancomycin and Zosyn      Essential hypertension    Continue present management      PAD (peripheral artery disease)    Patient had fem-pop bypass by  in 3/21.  However, patient has not been compliant with medication as prescribed and still continues to smoke cigarettes    On today's exam, DP and PT were not palpable.    Will proceed with CTA with runoff.  Will consult Dr. Alvarado      Diabetes mellitus type 2, uncontrolled    Patient's hemoglobin A1c was 12.8 today  Will start patient on sliding scale insulin q.4 hours to maintain CBGs in the range of 130s to 180s        Tobacco abuse    Patient continued to smoke in spite of having CAD and PAD.  Tobacco cessation was advised        VTE Risk Mitigation (From admission, onward)         Ordered     enoxaparin injection 40 mg  Daily         07/07/22 2045     IP VTE HIGH RISK PATIENT  Once         07/07/22 2045                Discharge Planning   DAVID:      Code Status: Full Code   Is the patient medically ready for discharge?:     Reason for patient still in hospital (select all that apply): Other (specify) OR today                     Girish Carmona MD  Department of Hospital Medicine    [facility] - Periop Services

## 2022-07-08 NOTE — PROGRESS NOTES
Vascular     Left fem pop 1 year ago Dr alvarado  Left toes amputuaion dr orozco today   CTA reviewed with Dr. Alvarado fem pop patent   RAHEEM 0.5  Dr. Alvarado to offer left Angio in OR Monday   Npo after mn Sunday   Type and screen

## 2022-07-08 NOTE — ASSESSMENT & PLAN NOTE
Patient's hemoglobin A1c was 12.8 today  Will start patient on sliding scale insulin q.4 hours to maintain CBGs in the range of 130s to 180s

## 2022-07-08 NOTE — NURSING
0725 Pt resting in bed. No distress noted. VS stable. States she had a good night. About to go to surgery. Call bell in reach. Side rails up x2.     1045 Pt back from surgery. No distress noted. Son at bedside. VS stable.  Requests pain medication. Call bell in reach. Side rails up x2.     1328 Pt sitting up in bed. Assisted with bedside commode. Son at bedside. No distress noted. No complaints or requests at this time. Call bell in reach. Side rails up x2.     1528 Pt sitting up in bed. Son at bedside. No distress noted. No complaints or requests at this time. Call bell in reach. Side rails up x2.     1658 Pt sitting up in bed watching tv and eating dinner. No distress noted. Medications given. Call bell in reach. Side rails up x2.     1835 Pt sitting up in bed. No distress noted. VS stable. No complaints or requests at this time. Call bell in reach. Side rails up x2. Requests pain medication with night medication.

## 2022-07-08 NOTE — ANESTHESIA PROCEDURE NOTES
Intubation    Date/Time: 7/8/2022 9:05 AM  Performed by: Jordi Oviedo CRNA  Authorized by: Ye Klein MD     Intubation:     Induction:  Intravenous    Intubated:  Postinduction    Mask Ventilation:  Easy mask    Attempts:  1    Attempted By:  CRNA    Method of Intubation:  Direct    Difficult Airway Encountered?: No      Complications:  None    Airway Device:  Supraglottic airway/LMA    Airway Device Size:  3.0    Style/Cuff Inflation:  Cuffed (inflated to minimal occlusive pressure)    Placement Verified By:  Capnometry    Complicating Factors:  None    Findings Post-Intubation:  BS equal bilateral and atraumatic/condition of teeth unchanged

## 2022-07-08 NOTE — SUBJECTIVE & OBJECTIVE
Past Medical History:   Diagnosis Date    Diabetes mellitus     Hypertension        Past Surgical History:   Procedure Laterality Date    CREATION OF FEMOROPOPLITEAL ARTERIAL BYPASS USING GRAFT Left 4/2/2021    Procedure: CREATION, BYPASS, ARTERIAL, FEMORAL TO POPLITEAL, USING GRAFT;  Surgeon: Alisha Alvarado MD;  Location: [facility], [place];  Service: General;  Laterality: Left;       Review of patient's allergies indicates:   Allergen Reactions    Levofloxacin        No current facility-administered medications on file prior to encounter.     Current Outpatient Medications on File Prior to Encounter   Medication Sig    aspirin (ECOTRIN) 81 MG EC tablet Take 81 mg by mouth once daily.    clopidogreL (PLAVIX) 75 mg tablet Take 1 tablet (75 mg total) by mouth once daily.    famotidine (PEPCID) 20 MG tablet Take 20 mg by mouth 2 (two) times daily.    glipiZIDE (GLUCOTROL) 10 MG tablet Take 10 mg by mouth 2 (two) times daily before meals.    HYDROcodone-acetaminophen (NORCO) 5-325 mg per tablet Take 1 tablet by mouth every 6 (six) hours as needed for Pain.    lisinopriL-hydrochlorothiazide (PRINZIDE,ZESTORETIC) 20-25 mg Tab Take 1 tablet by mouth once daily.    lovastatin (MEVACOR) 40 MG tablet Take 40 mg by mouth every evening.    metFORMIN (GLUCOPHAGE) 1000 MG tablet Take 1,000 mg by mouth 2 (two) times daily with meals.     Family History       Problem Relation (Age of Onset)    Heart disease Father    Hypertension Mother, Father          Tobacco Use    Smoking status: Current Every Day Smoker     Packs/day: 2.00    Smokeless tobacco: Former User   Substance and Sexual Activity    Alcohol use: Never    Drug use: Never    Sexual activity: Not on file     Review of Systems   Constitutional:  Negative for chills and fever.   HENT:  Negative for postnasal drip and rhinorrhea.    Eyes:  Negative for itching.   Respiratory:  Negative for shortness of breath.    Cardiovascular:  Negative for chest pain, palpitations and leg  swelling.   Gastrointestinal:  Negative for abdominal distention, abdominal pain, diarrhea, nausea and vomiting.   Endocrine: Negative for cold intolerance, heat intolerance, polydipsia, polyphagia and polyuria.   Genitourinary:  Negative for dysuria and hematuria.   Musculoskeletal:  Negative for arthralgias, joint swelling, myalgias, neck pain and neck stiffness.        Right great toe pain was reported   Skin:  Negative for pallor and rash.   Allergic/Immunologic: Negative for environmental allergies, food allergies and immunocompromised state.   Neurological:  Negative for dizziness, seizures, facial asymmetry, light-headedness and numbness.   Objective:     Vital Signs (Most Recent):  Temp: 97.9 °F (36.6 °C) (07/07/22 1608)  Pulse: (!) 114 (07/07/22 1608)  Resp: 20 (07/07/22 1608)  BP: (!) 111/58 (07/07/22 1608)  SpO2: 97 % (07/07/22 1608)   Vital Signs (24h Range):  Temp:  [97.9 °F (36.6 °C)] 97.9 °F (36.6 °C)  Pulse:  [114] 114  Resp:  [20] 20  SpO2:  [97 %] 97 %  BP: (111)/(58) 111/58     Weight: 78.5 kg (173 lb)  Body mass index is 27.92 kg/m².    Physical Exam  Vitals reviewed.   Constitutional:       General: She is not in acute distress.     Appearance: Normal appearance.   HENT:      Head: Normocephalic and atraumatic.      Right Ear: External ear normal.      Left Ear: External ear normal.   Eyes:      Extraocular Movements: Extraocular movements intact.      Pupils: Pupils are equal, round, and reactive to light.   Cardiovascular:      Rate and Rhythm: Normal rate and regular rhythm.      Pulses: Normal pulses.      Heart sounds: Normal heart sounds. No murmur heard.  Pulmonary:      Effort: Pulmonary effort is normal. No respiratory distress.      Breath sounds: Normal breath sounds. No wheezing.   Chest:      Chest wall: No tenderness.   Abdominal:      General: Abdomen is flat.      Palpations: Abdomen is soft. There is no mass.      Tenderness: There is no abdominal tenderness. There is no right  CVA tenderness or left CVA tenderness.   Musculoskeletal:      Comments: Patient had edematous and erythematous distal  L lower extremity involving the foot and ankle.  Dry gangrene was present on the entire proximal phalanx of great toe (distal phalanx was not present presumably secondary to auto amputation stemming from dry gangrene ) and on the distal tuft of 2nd toe. DP and PT were not palpable   Skin:     General: Skin is warm and dry.      Capillary Refill: Capillary refill takes less than 2 seconds.   Neurological:      General: No focal deficit present.      Mental Status: She is alert and oriented to person, place, and time. Mental status is at baseline.   Psychiatric:         Mood and Affect: Mood normal.         Thought Content: Thought content normal.     Cranial nerves 2-12 were grossly intact     Significant Labs: All pertinent labs within the past 24 hours have been reviewed.    Significant Imaging: I have reviewed all pertinent imaging results/findings within the past 24 hours.

## 2022-07-08 NOTE — HPI
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  Patient stated that she has not followed up with Dr. Alvarado, has not been taking Plavix, and continued to smoke cigarettes.  Patient otherwise denied any fever chills chest pain palpitation PND orthopnea or shortness of breath.  On presentation, patient was tachycardic from vital signs were otherwise stable and patient was afebrile.  Workup in ED was unremarkable other than presence of hyperglycemia with blood glucose of 356. Patient will be admitted for further evaluation and intervention

## 2022-07-08 NOTE — PLAN OF CARE
Problem: Diabetes Comorbidity  Goal: Blood Glucose Level Within Targeted Range  Outcome: Ongoing, Progressing  Intervention: Monitor and Manage Glycemia  Flowsheets (Taken 7/8/2022 1841)  Glycemic Management:   blood glucose monitored   carbohydrate replacement provided   insulin dose matched to carbohydrate intake   supplemental insulin given

## 2022-07-08 NOTE — PLAN OF CARE
Problem: Diabetes Comorbidity  Goal: Blood Glucose Level Within Targeted Range  Outcome: Ongoing, Progressing     Problem: Impaired Wound Healing  Goal: Optimal Wound Healing  Outcome: Ongoing, Progressing     Problem: Pain Acute  Goal: Acceptable Pain Control and Functional Ability  Outcome: Ongoing, Progressing

## 2022-07-08 NOTE — OP NOTE
[facility] - Periop Services     Operative Note    SUMMARY     Date of Procedure: 7/8/2022     Procedure: Procedure(s) (LRB):  left great and 2nd toe (Left)     Surgeon(s) and Role:     * Mj Rivera MD - Primary    Assisting Surgeon: None    Pre-Operative Diagnosis: Wound infection [T14.8XXA, L08.9]  Gangrenous toe [I96]    Post-Operative Diagnosis: Post-Op Diagnosis Codes:     * Wound infection [T14.8XXA, L08.9]     * Gangrenous toe [I96]    Anesthesia: General    Technical Procedures Used:  The patient was brought to the operating room and placed in supine position.  An LMA was placed and anesthesia was administered per anesthesia staff.  The left foot was prepped and draped in standard fashion.  Circumferential incision was then performed around the base of the left great toe extending to the left 2nd toe.  Subsequent to this, the incision was carried all the way down to the level of metatarsophalangeal joint.  Joint capsule was entered, and continued dissection was performed until the great toe was disarticulated.  Following this incision was continued with cautery until the left metatarsophalangeal joint was reached.  The joint was entered and the left 2nd toe was disarticulated.  The entire specimen was sent together to pathology.  Debridement was then performed using the rongeur to debride the metatarsal heads of the 1st and 2nd metatarsal.  Sesamoid bones and tendons were also debrided with this instrument.  An extensive amount of irrigation was then performed and cautery was used for hemostasis, in addition to pressure.  Interrupted nylon sutures were placed widely to reapproximate skin edges.  Dry dressings were then placed, applying a little pressure to the wound.  The patient was awakened from anesthesia in stable condition.    Description of the Findings of the Procedure:  The great toe and 2nd toe were amputated at the MTP joint using a single transverse incision.  Cultures were taken of  the metatarsal bones, and culture swabs were taken from the 2nd toe.  Partial closure was achieved with interrupted nylon sutures.    Assistant(s): None    Complications: No    Estimated Blood Loss (EBL): 25 mL           Implants: * No implants in log *    Specimens:   Specimen (24h ago, onward)             Start     Ordered    07/08/22 0937  Surgical Pathology  RELEASE UPON ORDERING         07/08/22 0937                 Condition: Good      Complications:  None

## 2022-07-08 NOTE — HOSPITAL COURSE
07/08 OR today with Dr. Rivera for gangrenous left great toe.   07/09 Patient voices no complaints. Family at bedside. . Will add insulin sliding scale. Will add levemir 10 bid  07/10 Patient sitting up in bed. . Wound check per Surgery.   07/11 Patient voices no complaints. DC today with wound check in 10 days with Dr. Rivera. DC with Nicotine patch. Stop smoking.

## 2022-07-08 NOTE — TRANSFER OF CARE
"Anesthesia Transfer of Care Note    Patient: Leti Oquendo    Procedure(s) Performed: Procedure(s) (LRB):  left graet and 2nd toe (Left)    Patient location: PACU    Anesthesia Type: general    Transport from OR: Transported from OR on room air with adequate spontaneous ventilation    Post pain: adequate analgesia    Post assessment: no apparent anesthetic complications    Post vital signs: stable    Level of consciousness: responds to stimulation, awake and sedated    Nausea/Vomiting: no nausea/vomiting    Complications: none    Transfer of care protocol was followed      Last vitals:   Visit Vitals  BP (!) 120/54 (BP Location: Left arm, Patient Position: Lying)   Pulse 80   Temp 36.7 °C (98.1 °F) (Oral)   Resp 10   Ht 5' 6" (1.676 m)   Wt 78.5 kg (173 lb)   SpO2 100%   BMI 27.92 kg/m²     "

## 2022-07-08 NOTE — H&P
[facility] Emergency Department  Hospital Medicine  History & Physical    Patient Name: Leti Oquendo  MRN: [id number]  Patient Class: IP- Inpatient  Admission Date: 7/7/2022  Attending Physician: DALIA Carmona MD  Primary Care Provider: Primary Doctor No         Patient information was obtained from patient and ER records.     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  Patient stated that she has not followed up with Dr. Alvarado, has not been taking Plavix, and continued to smoke cigarettes.  Patient otherwise denied any fever chills chest pain palpitation PND orthopnea or shortness of breath.  On presentation, patient was tachycardic from vital signs were otherwise stable and patient was afebrile.  Workup in ED was unremarkable other than presence of hyperglycemia with blood glucose of 356. Patient will be admitted for further evaluation and intervention      Past Medical History:   Diagnosis Date    Diabetes mellitus     Hypertension        Past Surgical History:   Procedure Laterality Date    CREATION OF FEMOROPOPLITEAL ARTERIAL BYPASS USING GRAFT Left 4/2/2021    Procedure: CREATION, BYPASS, ARTERIAL, FEMORAL TO POPLITEAL, USING GRAFT;  Surgeon: Alisha Alvarado MD;  Location: [facility] OR;  Service: General;  Laterality: Left;       Review of patient's allergies indicates:   Allergen Reactions    Levofloxacin        No current  facility-administered medications on file prior to encounter.     Current Outpatient Medications on File Prior to Encounter   Medication Sig    aspirin (ECOTRIN) 81 MG EC tablet Take 81 mg by mouth once daily.    clopidogreL (PLAVIX) 75 mg tablet Take 1 tablet (75 mg total) by mouth once daily.    famotidine (PEPCID) 20 MG tablet Take 20 mg by mouth 2 (two) times daily.    glipiZIDE (GLUCOTROL) 10 MG tablet Take 10 mg by mouth 2 (two) times daily before meals.    HYDROcodone-acetaminophen (NORCO) 5-325 mg per tablet Take 1 tablet by mouth every 6 (six) hours as needed for Pain.    lisinopriL-hydrochlorothiazide (PRINZIDE,ZESTORETIC) 20-25 mg Tab Take 1 tablet by mouth once daily.    lovastatin (MEVACOR) 40 MG tablet Take 40 mg by mouth every evening.    metFORMIN (GLUCOPHAGE) 1000 MG tablet Take 1,000 mg by mouth 2 (two) times daily with meals.     Family History       Problem Relation (Age of Onset)    Heart disease Father    Hypertension Mother, Father          Tobacco Use    Smoking status: Current Every Day Smoker     Packs/day: 2.00    Smokeless tobacco: Former User   Substance and Sexual Activity    Alcohol use: Never    Drug use: Never    Sexual activity: Not on file     Review of Systems   Constitutional:  Negative for chills and fever.   HENT:  Negative for postnasal drip and rhinorrhea.    Eyes:  Negative for itching.   Respiratory:  Negative for shortness of breath.    Cardiovascular:  Negative for chest pain, palpitations and leg swelling.   Gastrointestinal:  Negative for abdominal distention, abdominal pain, diarrhea, nausea and vomiting.   Endocrine: Negative for cold intolerance, heat intolerance, polydipsia, polyphagia and polyuria.   Genitourinary:  Negative for dysuria and hematuria.   Musculoskeletal:  Negative for arthralgias, joint swelling, myalgias, neck pain and neck stiffness.        Right great toe pain was reported   Skin:  Negative for pallor and rash.    Allergic/Immunologic: Negative for environmental allergies, food allergies and immunocompromised state.   Neurological:  Negative for dizziness, seizures, facial asymmetry, light-headedness and numbness.   Objective:     Vital Signs (Most Recent):  Temp: 97.9 °F (36.6 °C) (07/07/22 1608)  Pulse: (!) 114 (07/07/22 1608)  Resp: 20 (07/07/22 1608)  BP: (!) 111/58 (07/07/22 1608)  SpO2: 97 % (07/07/22 1608)   Vital Signs (24h Range):  Temp:  [97.9 °F (36.6 °C)] 97.9 °F (36.6 °C)  Pulse:  [114] 114  Resp:  [20] 20  SpO2:  [97 %] 97 %  BP: (111)/(58) 111/58     Weight: 78.5 kg (173 lb)  Body mass index is 27.92 kg/m².    Physical Exam  Vitals reviewed.   Constitutional:       General: She is not in acute distress.     Appearance: Normal appearance.   HENT:      Head: Normocephalic and atraumatic.      Right Ear: External ear normal.      Left Ear: External ear normal.   Eyes:      Extraocular Movements: Extraocular movements intact.      Pupils: Pupils are equal, round, and reactive to light.   Cardiovascular:      Rate and Rhythm: Normal rate and regular rhythm.      Pulses: Normal pulses.      Heart sounds: Normal heart sounds. No murmur heard.  Pulmonary:      Effort: Pulmonary effort is normal. No respiratory distress.      Breath sounds: Normal breath sounds. No wheezing.   Chest:      Chest wall: No tenderness.   Abdominal:      General: Abdomen is flat.      Palpations: Abdomen is soft. There is no mass.      Tenderness: There is no abdominal tenderness. There is no right CVA tenderness or left CVA tenderness.   Musculoskeletal:      Comments: Patient had edematous and erythematous distal  L lower extremity involving the foot and ankle.  Dry gangrene was present on the entire proximal phalanx of great toe (distal phalanx was not present presumably secondary to auto amputation stemming from dry gangrene ) and on the distal tuft of 2nd toe. DP and PT were not palpable   Skin:     General: Skin is warm and dry.       Capillary Refill: Capillary refill takes less than 2 seconds.   Neurological:      General: No focal deficit present.      Mental Status: She is alert and oriented to person, place, and time. Mental status is at baseline.   Psychiatric:         Mood and Affect: Mood normal.         Thought Content: Thought content normal.     Cranial nerves 2-12 were grossly intact     Significant Labs: All pertinent labs within the past 24 hours have been reviewed.    Significant Imaging: I have reviewed all pertinent imaging results/findings within the past 24 hours.    Assessment/Plan:     * Gangrenous toe    Patient underwent fem-pop bypass in 3/21 for nonhealing L great toe ulcer, but patient never followed up with Dr. Alvarado.  Has not been compliant with medications including Plavix and continued to smoke cigarettes    Unfortunately, the wound progressively became worse for the past year and patient decided come to the ED this evening for evaluation.    Patient was found to have dry gangrene of left great toe with auto amputation of distal phalanx.    Patient will require amputation, and Dr. Rivera has been consulted by ED provider      Cellulitis of left lower extremity    Patient was also found to have edematous and erythematous left foot and ankle.  Will empirically start patient on vancomycin and Zosyn      PAD (peripheral artery disease)    Patient had fem-pop bypass by  in 3/21.  However, patient has not been compliant with medication as prescribed and still continues to smoke cigarettes    On today's exam, DP and PT were not palpable.    Will proceed with CTA with runoff.  Will consult Dr. Alvarado      Diabetes mellitus type 2, uncontrolled    Patient's hemoglobin A1c was 12.8 today  Will start patient on sliding scale insulin q.4 hours to maintain CBGs in the range of 130s to 180s        Coronary artery disease involving native coronary artery without angina pectoris    On stress test last year, patient was found to  have a mild anterior wall ischemia with normal echocardiogram.  Decision was made to treat this patient with medications only.  However, patient has not been compliant with medication as prescribed.  Currently, patient offered no complaints.  Will resume home medications and monitor    Hyponatremia    Corrected sodium was 134. Will start patient on normal saline at 100 cc an hour and DC hydrochlorothiazide      Essential hypertension    Continue present management      Tobacco abuse    Patient continued to smoke in spite of having CAD and PAD.  Tobacco cessation was advised        VTE Risk Mitigation (From admission, onward)    None             Shine Timmons MD  Department of Hospital Medicine   [facility] [place] - Emergency Department

## 2022-07-08 NOTE — CONSULTS
[facility] - [facility]  General Surgery  Consult Note    Patient Name: Leti Oquendo  MRN: [id number]  Code Status: Full Code  Admission Date: 7/7/2022  Hospital Length of Stay: 1 days  Attending Physician: Girish Carmona MD  Primary Care Provider: Primary Doctor No    Patient information was obtained from patient and ER records.     Consults  Subjective:     Principal Problem: Gangrenous toe    History of Present Illness: This 61-year-old female patient presented to the emergency department with pain in the left foot and toes that are turning black.  She reports having had previous vascular intervention on the left side, and this was performed here by Dr. Alvarado (fem pop with graft 4/2021).  After evaluation in the emergency department, there was felt that she has developed infection within the dry gangrene.  She is admitted for antibiotics and surgical management of gangrenous left great toe and left 2nd toe.      No current facility-administered medications on file prior to encounter.     Current Outpatient Medications on File Prior to Encounter   Medication Sig    famotidine (PEPCID) 20 MG tablet Take 20 mg by mouth nightly as needed for Heartburn.    glipiZIDE (GLUCOTROL) 10 MG tablet Take 10 mg by mouth daily with breakfast.    lisinopriL-hydrochlorothiazide (PRINZIDE,ZESTORETIC) 20-25 mg Tab Take 1 tablet by mouth once daily.    metFORMIN (GLUCOPHAGE) 1000 MG tablet Take 1,000 mg by mouth 2 (two) times daily with meals.    HYDROcodone-acetaminophen (NORCO) 5-325 mg per tablet Take 1 tablet by mouth every 6 (six) hours as needed for Pain.    lovastatin (MEVACOR) 40 MG tablet Take 40 mg by mouth every evening.       Review of patient's allergies indicates:   Allergen Reactions    Levofloxacin        Past Medical History:   Diagnosis Date    Diabetes mellitus     Hypertension      Past Surgical History:   Procedure Laterality Date    CREATION OF FEMOROPOPLITEAL ARTERIAL BYPASS USING GRAFT Left  4/2/2021    Procedure: CREATION, BYPASS, ARTERIAL, FEMORAL TO POPLITEAL, USING GRAFT;  Surgeon: Alisha Alvarado MD;  Location: [facility];  Service: General;  Laterality: Left;     Family History       Problem Relation (Age of Onset)    Heart disease Father    Hypertension Mother, Father          Tobacco Use    Smoking status: Current Every Day Smoker     Packs/day: 2.00    Smokeless tobacco: Former User   Substance and Sexual Activity    Alcohol use: Never    Drug use: Never    Sexual activity: Not on file     Review of Systems   Constitutional:  Negative for activity change, appetite change and fever.   HENT:  Negative for sore throat. Hearing loss: cough.   Eyes:  Negative for visual disturbance.   Respiratory:  Negative for cough and shortness of breath.    Cardiovascular:  Negative for chest pain.   Gastrointestinal:  Negative for abdominal pain.   Endocrine: Negative.    Genitourinary: Negative.    Musculoskeletal: Negative.    Skin:  Positive for color change and wound.   Neurological:  Negative for numbness.   Hematological: Negative.    Psychiatric/Behavioral: Negative.     Objective:     Vital Signs (Most Recent):  Temp: 98.4 °F (36.9 °C) (07/08/22 0400)  Pulse: 84 (07/08/22 0400)  Resp: 18 (07/08/22 0400)  BP: (!) 138/55 (07/08/22 0400)  SpO2: 97 % (07/08/22 0400)   Vital Signs (24h Range):  Temp:  [97.9 °F (36.6 °C)-98.9 °F (37.2 °C)] 98.4 °F (36.9 °C)  Pulse:  [] 84  Resp:  [16-21] 18  SpO2:  [97 %-99 %] 97 %  BP: (111-158)/(55-74) 138/55     Weight: 78.5 kg (173 lb)  Body mass index is 27.92 kg/m².    Physical Exam  Cardiovascular:      Rate and Rhythm: Normal rate.   Pulmonary:      Effort: Pulmonary effort is normal. No respiratory distress.   Abdominal:      Tenderness: There is no abdominal tenderness.   Musculoskeletal:         General: Swelling and tenderness present.   Skin:     Comments: Left great toe with dry gangrene, having black discoloration of the entire toe.  There is a  foul odor detected, but I do not appreciate drainage.  There is extreme tenderness.  The distal and plantar aspect of the left 2nd toe also has a black appearance of the skin with ulceration laterally and mild amount of serous drainage.   Neurological:      General: No focal deficit present.      Mental Status: She is alert.   Psychiatric:         Mood and Affect: Mood normal.       Significant Labs:  I have reviewed all pertinent lab results within the past 24 hours.  CBC:   Recent Labs   Lab 07/08/22  0447   WBC 9.88   RBC 4.24   HGB 12.0   HCT 36.7*      MCV 86.6   MCH 28.3   MCHC 32.7     BMP:   Recent Labs   Lab 07/07/22  1719   *   *   K 3.6   CL 93*   CO2 27   BUN 6*   CREATININE 0.73   CALCIUM 8.9       Significant Diagnostics:  None      Assessment/Plan:     * Gangrenous toe  Developing wet gangrene, suggested by odor  Plan toe amputation of the great toe, and likely partial amputation of  the 2nd toe to, as well    Risks include nonhealing, cardiac or pulmonary complications, and potential for further surgery.  She understands wishes to proceed.      VTE Risk Mitigation (From admission, onward)         Ordered     enoxaparin injection 40 mg  Daily         07/07/22 2045     IP VTE HIGH RISK PATIENT  Once         07/07/22 2045                Thank you for your consult. I will follow-up with patient. Please contact us if you have any additional questions.    Mj Rivera MD  General Surgery  [facility] - Orthopedic

## 2022-07-08 NOTE — SUBJECTIVE & OBJECTIVE
No current facility-administered medications on file prior to encounter.     Current Outpatient Medications on File Prior to Encounter   Medication Sig    famotidine (PEPCID) 20 MG tablet Take 20 mg by mouth nightly as needed for Heartburn.    glipiZIDE (GLUCOTROL) 10 MG tablet Take 10 mg by mouth daily with breakfast.    lisinopriL-hydrochlorothiazide (PRINZIDE,ZESTORETIC) 20-25 mg Tab Take 1 tablet by mouth once daily.    metFORMIN (GLUCOPHAGE) 1000 MG tablet Take 1,000 mg by mouth 2 (two) times daily with meals.    HYDROcodone-acetaminophen (NORCO) 5-325 mg per tablet Take 1 tablet by mouth every 6 (six) hours as needed for Pain.    lovastatin (MEVACOR) 40 MG tablet Take 40 mg by mouth every evening.       Review of patient's allergies indicates:   Allergen Reactions    Levofloxacin        Past Medical History:   Diagnosis Date    Diabetes mellitus     Hypertension      Past Surgical History:   Procedure Laterality Date    CREATION OF FEMOROPOPLITEAL ARTERIAL BYPASS USING GRAFT Left 4/2/2021    Procedure: CREATION, BYPASS, ARTERIAL, FEMORAL TO POPLITEAL, USING GRAFT;  Surgeon: Alisha Alvarado MD;  Location: [facility];  Service: General;  Laterality: Left;     Family History       Problem Relation (Age of Onset)    Heart disease Father    Hypertension Mother, Father          Tobacco Use    Smoking status: Current Every Day Smoker     Packs/day: 2.00    Smokeless tobacco: Former User   Substance and Sexual Activity    Alcohol use: Never    Drug use: Never    Sexual activity: Not on file     Review of Systems   Constitutional:  Negative for activity change, appetite change and fever.   HENT:  Negative for sore throat. Hearing loss: cough.   Eyes:  Negative for visual disturbance.   Respiratory:  Negative for cough and shortness of breath.    Cardiovascular:  Negative for chest pain.   Gastrointestinal:  Negative for abdominal pain.   Endocrine: Negative.    Genitourinary: Negative.    Musculoskeletal: Negative.     Skin:  Positive for color change and wound.   Neurological:  Negative for numbness.   Hematological: Negative.    Psychiatric/Behavioral: Negative.     Objective:     Vital Signs (Most Recent):  Temp: 98.4 °F (36.9 °C) (07/08/22 0400)  Pulse: 84 (07/08/22 0400)  Resp: 18 (07/08/22 0400)  BP: (!) 138/55 (07/08/22 0400)  SpO2: 97 % (07/08/22 0400)   Vital Signs (24h Range):  Temp:  [97.9 °F (36.6 °C)-98.9 °F (37.2 °C)] 98.4 °F (36.9 °C)  Pulse:  [] 84  Resp:  [16-21] 18  SpO2:  [97 %-99 %] 97 %  BP: (111-158)/(55-74) 138/55     Weight: 78.5 kg (173 lb)  Body mass index is 27.92 kg/m².    Physical Exam  Cardiovascular:      Rate and Rhythm: Normal rate.   Pulmonary:      Effort: Pulmonary effort is normal. No respiratory distress.   Abdominal:      Tenderness: There is no abdominal tenderness.   Musculoskeletal:         General: Swelling and tenderness present.   Skin:     Comments: Left great toe with dry gangrene, having black discoloration of the entire toe.  There is a foul odor detected, but I do not appreciate drainage.  There is extreme tenderness.  The distal and plantar aspect of the left 2nd toe also has a black appearance of the skin with ulceration laterally and mild amount of serous drainage.   Neurological:      General: No focal deficit present.      Mental Status: She is alert.   Psychiatric:         Mood and Affect: Mood normal.       Significant Labs:  I have reviewed all pertinent lab results within the past 24 hours.  CBC:   Recent Labs   Lab 07/08/22  0447   WBC 9.88   RBC 4.24   HGB 12.0   HCT 36.7*      MCV 86.6   MCH 28.3   MCHC 32.7     BMP:   Recent Labs   Lab 07/07/22  1719   *   *   K 3.6   CL 93*   CO2 27   BUN 6*   CREATININE 0.73   CALCIUM 8.9       Significant Diagnostics:  None

## 2022-07-08 NOTE — ASSESSMENT & PLAN NOTE
Patient underwent fem-pop bypass in 3/21 for nonhealing L great toe ulcer, but patient never followed up with Dr. Alvarado.  Has not been compliant with medications including Plavix and continued to smoke cigarettes    Unfortunately, the wound progressively became worse for the past year and patient decided come to the ED this evening for evaluation.    Patient was found to have dry gangrene of left great toe with auto amputation of distal phalanx.    Patient will require amputation, and Dr. Rivera has been consulted by ED provider

## 2022-07-08 NOTE — ASSESSMENT & PLAN NOTE
On stress test last year, patient was found to have a mild anterior wall ischemia with normal echocardiogram.  Decision was made to treat this patient with medications only.  However, patient has not been compliant with medication as prescribed.  Currently, patient offered no complaints.  Will resume home medications and monitor

## 2022-07-08 NOTE — PLAN OF CARE
Problem: Diabetes Comorbidity  Goal: Blood Glucose Level Within Targeted Range  7/8/2022 0321 by Bird Carmichael RN  Outcome: Ongoing, Progressing  7/7/2022 2309 by Bird Carmichael RN  Outcome: Ongoing, Progressing     Problem: Impaired Wound Healing  Goal: Optimal Wound Healing  7/8/2022 0321 by Bird Carmichael RN  Outcome: Ongoing, Progressing  7/7/2022 2309 by Brid Carmichael RN  Outcome: Ongoing, Progressing     Problem: Pain Acute  Goal: Acceptable Pain Control and Functional Ability  7/8/2022 0321 by Bird Carmichael RN  Outcome: Ongoing, Progressing  7/7/2022 2309 by Bird Carmichael RN  Outcome: Ongoing, Progressing

## 2022-07-08 NOTE — ASSESSMENT & PLAN NOTE
Patient was also found to have edematous and erythematous left foot and ankle.  Will empirically start patient on vancomycin and Zosyn

## 2022-07-08 NOTE — HPI
This 61-year-old female patient presented to the emergency department with pain in the left foot and toes that are turning black.  She reports having had previous vascular intervention on the left side, and this was performed here by Dr. Alvarado.  After evaluation in the emergency department, there was felt that she has developed infection within the dry gangrene.  She is admitted for antibiotics and surgical management of gangrenous left great toe and left 2nd toe.

## 2022-07-09 LAB
ALBUMIN SERPL BCP-MCNC: 2.5 G/DL (ref 3.5–5)
ALBUMIN/GLOB SERPL: 0.7 {RATIO}
ALP SERPL-CCNC: 88 U/L (ref 50–130)
ALT SERPL W P-5'-P-CCNC: 12 U/L (ref 13–56)
ANION GAP SERPL CALCULATED.3IONS-SCNC: 11 MMOL/L (ref 7–16)
AST SERPL W P-5'-P-CCNC: 8 U/L (ref 15–37)
BASOPHILS # BLD AUTO: 0.04 K/UL (ref 0–0.2)
BASOPHILS NFR BLD AUTO: 0.4 % (ref 0–1)
BILIRUB SERPL-MCNC: 0.4 MG/DL (ref 0–1.2)
BUN SERPL-MCNC: 6 MG/DL (ref 7–18)
BUN/CREAT SERPL: 10 (ref 6–20)
CALCIUM SERPL-MCNC: 8.2 MG/DL (ref 8.5–10.1)
CHLORIDE SERPL-SCNC: 101 MMOL/L (ref 98–107)
CO2 SERPL-SCNC: 31 MMOL/L (ref 21–32)
CREAT SERPL-MCNC: 0.63 MG/DL (ref 0.55–1.02)
DIFFERENTIAL METHOD BLD: ABNORMAL
EOSINOPHIL # BLD AUTO: 0.1 K/UL (ref 0–0.5)
EOSINOPHIL NFR BLD AUTO: 1.1 % (ref 1–4)
ERYTHROCYTE [DISTWIDTH] IN BLOOD BY AUTOMATED COUNT: 14.6 % (ref 11.5–14.5)
GLOBULIN SER-MCNC: 3.4 G/DL (ref 2–4)
GLUCOSE SERPL-MCNC: 243 MG/DL (ref 70–105)
GLUCOSE SERPL-MCNC: 271 MG/DL (ref 70–105)
GLUCOSE SERPL-MCNC: 271 MG/DL (ref 74–106)
GLUCOSE SERPL-MCNC: 282 MG/DL (ref 70–105)
GLUCOSE SERPL-MCNC: 299 MG/DL (ref 70–105)
HCT VFR BLD AUTO: 36.2 % (ref 38–47)
HGB BLD-MCNC: 11.3 G/DL (ref 12–16)
IMM GRANULOCYTES # BLD AUTO: 0.03 K/UL (ref 0–0.04)
IMM GRANULOCYTES NFR BLD: 0.3 % (ref 0–0.4)
LYMPHOCYTES # BLD AUTO: 2.53 K/UL (ref 1–4.8)
LYMPHOCYTES NFR BLD AUTO: 28.3 % (ref 27–41)
MCH RBC QN AUTO: 28.3 PG (ref 27–31)
MCHC RBC AUTO-ENTMCNC: 31.2 G/DL (ref 32–36)
MCV RBC AUTO: 90.7 FL (ref 80–96)
MONOCYTES # BLD AUTO: 0.87 K/UL (ref 0–0.8)
MONOCYTES NFR BLD AUTO: 9.7 % (ref 2–6)
MPC BLD CALC-MCNC: 11.4 FL (ref 9.4–12.4)
NEUTROPHILS # BLD AUTO: 5.38 K/UL (ref 1.8–7.7)
NEUTROPHILS NFR BLD AUTO: 60.2 % (ref 53–65)
NRBC # BLD AUTO: 0 X10E3/UL
NRBC, AUTO (.00): 0 %
PLATELET # BLD AUTO: 198 K/UL (ref 150–400)
POTASSIUM SERPL-SCNC: 4.5 MMOL/L (ref 3.5–5.1)
PROT SERPL-MCNC: 5.9 G/DL (ref 6.4–8.2)
RBC # BLD AUTO: 3.99 M/UL (ref 4.2–5.4)
SODIUM SERPL-SCNC: 138 MMOL/L (ref 136–145)
WBC # BLD AUTO: 8.95 K/UL (ref 4.5–11)

## 2022-07-09 PROCEDURE — 99900035 HC TECH TIME PER 15 MIN (STAT)

## 2022-07-09 PROCEDURE — 25000003 PHARM REV CODE 250: Performed by: SURGERY

## 2022-07-09 PROCEDURE — 63600175 PHARM REV CODE 636 W HCPCS: Performed by: FAMILY MEDICINE

## 2022-07-09 PROCEDURE — 99232 SBSQ HOSP IP/OBS MODERATE 35: CPT | Mod: ,,, | Performed by: FAMILY MEDICINE

## 2022-07-09 PROCEDURE — 11000001 HC ACUTE MED/SURG PRIVATE ROOM

## 2022-07-09 PROCEDURE — 63600175 PHARM REV CODE 636 W HCPCS: Performed by: SURGERY

## 2022-07-09 PROCEDURE — 80053 COMPREHEN METABOLIC PANEL: CPT | Performed by: SURGERY

## 2022-07-09 PROCEDURE — 94761 N-INVAS EAR/PLS OXIMETRY MLT: CPT

## 2022-07-09 PROCEDURE — C9399 UNCLASSIFIED DRUGS OR BIOLOG: HCPCS | Performed by: FAMILY MEDICINE

## 2022-07-09 PROCEDURE — 99232 PR SUBSEQUENT HOSPITAL CARE,LEVL II: ICD-10-PCS | Mod: ,,, | Performed by: FAMILY MEDICINE

## 2022-07-09 PROCEDURE — 25000003 PHARM REV CODE 250: Performed by: FAMILY MEDICINE

## 2022-07-09 PROCEDURE — 27000221 HC OXYGEN, UP TO 24 HOURS

## 2022-07-09 PROCEDURE — 85025 COMPLETE CBC W/AUTO DIFF WBC: CPT | Performed by: SURGERY

## 2022-07-09 PROCEDURE — S4991 NICOTINE PATCH NONLEGEND: HCPCS | Performed by: FAMILY MEDICINE

## 2022-07-09 PROCEDURE — 36415 COLL VENOUS BLD VENIPUNCTURE: CPT | Performed by: SURGERY

## 2022-07-09 PROCEDURE — 82962 GLUCOSE BLOOD TEST: CPT

## 2022-07-09 RX ORDER — GLUCAGON 1 MG
1 KIT INJECTION
Status: DISCONTINUED | OUTPATIENT
Start: 2022-07-09 | End: 2022-07-11 | Stop reason: HOSPADM

## 2022-07-09 RX ORDER — INSULIN ASPART 100 [IU]/ML
0-5 INJECTION, SOLUTION INTRAVENOUS; SUBCUTANEOUS
Status: DISCONTINUED | OUTPATIENT
Start: 2022-07-09 | End: 2022-07-11 | Stop reason: HOSPADM

## 2022-07-09 RX ADMIN — ENOXAPARIN SODIUM 40 MG: 40 INJECTION SUBCUTANEOUS at 05:07

## 2022-07-09 RX ADMIN — ATORVASTATIN CALCIUM 40 MG: 40 TABLET, FILM COATED ORAL at 08:07

## 2022-07-09 RX ADMIN — FAMOTIDINE 20 MG: 20 TABLET, FILM COATED ORAL at 08:07

## 2022-07-09 RX ADMIN — HYDROCODONE BITARTRATE AND ACETAMINOPHEN 1 TABLET: 7.5; 325 TABLET ORAL at 09:07

## 2022-07-09 RX ADMIN — VANCOMYCIN HYDROCHLORIDE 1500 MG: 1 INJECTION, POWDER, LYOPHILIZED, FOR SOLUTION INTRAVENOUS at 09:07

## 2022-07-09 RX ADMIN — PIPERACILLIN AND TAZOBACTAM 4.5 G: 4; .5 INJECTION, POWDER, FOR SOLUTION INTRAVENOUS at 09:07

## 2022-07-09 RX ADMIN — MORPHINE SULFATE 2 MG: 2 INJECTION, SOLUTION INTRAMUSCULAR; INTRAVENOUS at 03:07

## 2022-07-09 RX ADMIN — HYDROCODONE BITARTRATE AND ACETAMINOPHEN 1 TABLET: 7.5; 325 TABLET ORAL at 08:07

## 2022-07-09 RX ADMIN — LISINOPRIL 20 MG: 20 TABLET ORAL at 08:07

## 2022-07-09 RX ADMIN — ASPIRIN 81 MG: 81 TABLET, COATED ORAL at 08:07

## 2022-07-09 RX ADMIN — INSULIN DETEMIR 15 UNITS: 100 INJECTION, SOLUTION SUBCUTANEOUS at 09:07

## 2022-07-09 RX ADMIN — INSULIN ASPART 6 UNITS: 100 INJECTION, SOLUTION INTRAVENOUS; SUBCUTANEOUS at 05:07

## 2022-07-09 RX ADMIN — INSULIN ASPART 2 UNITS: 100 INJECTION, SOLUTION INTRAVENOUS; SUBCUTANEOUS at 11:07

## 2022-07-09 RX ADMIN — FAMOTIDINE 20 MG: 20 TABLET, FILM COATED ORAL at 09:07

## 2022-07-09 RX ADMIN — INSULIN DETEMIR 10 UNITS: 100 INJECTION, SOLUTION SUBCUTANEOUS at 09:07

## 2022-07-09 RX ADMIN — NICOTINE 1 PATCH: 21 PATCH, EXTENDED RELEASE TRANSDERMAL at 09:07

## 2022-07-09 RX ADMIN — PIPERACILLIN AND TAZOBACTAM 4.5 G: 4; .5 INJECTION, POWDER, FOR SOLUTION INTRAVENOUS at 12:07

## 2022-07-09 RX ADMIN — SODIUM CHLORIDE: 9 INJECTION, SOLUTION INTRAVENOUS at 03:07

## 2022-07-09 RX ADMIN — INSULIN ASPART 3 UNITS: 100 INJECTION, SOLUTION INTRAVENOUS; SUBCUTANEOUS at 03:07

## 2022-07-09 RX ADMIN — HYDROCODONE BITARTRATE AND ACETAMINOPHEN 1 TABLET: 7.5; 325 TABLET ORAL at 12:07

## 2022-07-09 RX ADMIN — PIPERACILLIN AND TAZOBACTAM 4.5 G: 4; .5 INJECTION, POWDER, FOR SOLUTION INTRAVENOUS at 05:07

## 2022-07-09 RX ADMIN — INSULIN ASPART 1 UNITS: 100 INJECTION, SOLUTION INTRAVENOUS; SUBCUTANEOUS at 09:07

## 2022-07-09 NOTE — PLAN OF CARE
Problem: Impaired Wound Healing  Goal: Optimal Wound Healing  Outcome: Ongoing, Progressing  Intervention: Promote Wound Healing  Flowsheets (Taken 7/9/2022 5316)  Activity Management:   Ambulated in room - L4   Arm raise - L1   Rolling - L1   Sitting at edge of bed - L2

## 2022-07-09 NOTE — NURSING
0701 Pt resting in bed. States she had a good night. No complaints or requests at this time. VS stable. Call bell in reach. Side rails up x2.     1140 Pt sitting up in bed. No distress noted. No complaints or requests at this time. VS stable. BG checked and covered. Call bell in reach. Side rails up x2.     1400 Pt resting in bed. No distress noted. No complaints or requests at this time. Call bell in reach. Side rails up x2.     1600 Pt resting in bed. Assisted to bedside commode. Gave medication and started new bag of NS. VS stable. Checked and covered BG. Call bell in reach. Side rails up x2.     1720 Pt sitting up in bed watching tv. Changed top linens. No distress noted. Medication given. Call bell in reach. Side rails up x2.

## 2022-07-09 NOTE — SUBJECTIVE & OBJECTIVE
Interval History: Patient voices no complaints. Family at bedside.    Review of Systems   Constitutional:  Negative for fatigue and fever.   Respiratory:  Negative for shortness of breath.    Cardiovascular:  Negative for chest pain.   Gastrointestinal:  Negative for abdominal distention.   Objective:     Vital Signs (Most Recent):  Temp: 98.1 °F (36.7 °C) (07/09/22 0701)  Pulse: 78 (07/09/22 0701)  Resp: 18 (07/09/22 0851)  BP: 135/62 (07/09/22 0701)  SpO2: 96 % (07/09/22 0701) Vital Signs (24h Range):  Temp:  [97.3 °F (36.3 °C)-98.8 °F (37.1 °C)] 98.1 °F (36.7 °C)  Pulse:  [78-98] 78  Resp:  [10-20] 18  SpO2:  [10 %-100 %] 96 %  BP: ()/(44-71) 135/62     Weight: 78.5 kg (173 lb)  Body mass index is 27.92 kg/m².    Intake/Output Summary (Last 24 hours) at 7/9/2022 0911  Last data filed at 7/8/2022 0948  Gross per 24 hour   Intake 1100 ml   Output 25 ml   Net 1075 ml      Physical Exam  Vitals and nursing note reviewed.   Constitutional:       Appearance: Normal appearance.   Cardiovascular:      Rate and Rhythm: Regular rhythm.      Heart sounds: Normal heart sounds.   Pulmonary:      Breath sounds: Normal breath sounds.   Abdominal:      Palpations: Abdomen is soft.   Neurological:      Mental Status: She is alert.       Significant Labs: All pertinent labs within the past 24 hours have been reviewed.  BMP:   Recent Labs   Lab 07/09/22 0319   *      K 4.5      CO2 31   BUN 6*   CREATININE 0.63   CALCIUM 8.2*     CBC:   Recent Labs   Lab 07/07/22 1719 07/08/22 0447 07/09/22 0319   WBC 11.29* 9.88 8.95   HGB 12.8 12.0 11.3*   HCT 39.3 36.7* 36.2*    224 198     CMP:   Recent Labs   Lab 07/07/22 1719 07/08/22 0447 07/09/22 0319   * 136 138   K 3.6 3.3* 4.5   CL 93* 99 101   CO2 27 30 31   * 124* 271*   BUN 6* 4* 6*   CREATININE 0.73 0.49* 0.63   CALCIUM 8.9 8.7 8.2*   PROT 7.3 6.3* 5.9*   ALBUMIN 3.1* 2.7* 2.5*   BILITOT 0.3 0.5 0.4   ALKPHOS 116 99 88   AST 13* 5*  8*   ALT 15 11* 12*   ANIONGAP 14 10 11   EGFRNONAA 86 136 102     POCT Glucose: No results for input(s): POCTGLUCOSE in the last 48 hours.    Significant Imaging:  none

## 2022-07-09 NOTE — PLAN OF CARE
Problem: Adult Inpatient Plan of Care  Goal: Plan of Care Review  Outcome: Ongoing, Progressing  Goal: Patient-Specific Goal (Individualized)  Outcome: Ongoing, Progressing  Goal: Absence of Hospital-Acquired Illness or Injury  Outcome: Ongoing, Progressing  Goal: Optimal Comfort and Wellbeing  Outcome: Ongoing, Progressing  Goal: Readiness for Transition of Care  Outcome: Ongoing, Progressing     Problem: Diabetes Comorbidity  Goal: Blood Glucose Level Within Targeted Range  Outcome: Ongoing, Progressing     Problem: Impaired Wound Healing  Goal: Optimal Wound Healing  Outcome: Ongoing, Progressing     Problem: Pain Acute  Goal: Acceptable Pain Control and Functional Ability  Outcome: Ongoing, Progressing

## 2022-07-09 NOTE — PROGRESS NOTES
Surgery    Dressing small amount of blood on the dressing no active bleeding    Plan evaluate wound in a.m.    The vascular supply peers in adequate consider angiogram of the

## 2022-07-09 NOTE — PROGRESS NOTES
[facility] Medicine  Progress Note    Patient Name: Leti Oquendo  MRN: [id number]  Patient Class: IP- Inpatient   Admission Date: 7/7/2022  Length of Stay: 2 days  Attending Physician: Girish Carmona MD  Primary Care Provider: Primary Doctor No        Subjective:     Principal Problem:Gangrenous toe        HPI:  Patient is a 61-year-old female with a history of poorly controlled type 2 diabetes complicated by neuropathic ulcers and peripheral arterial disease status post left fem-pop bypass year ago along with CAD with stress test a year ago revealing mild anterior wall ischemia ( treated with medications only ) who presents to emergency room with progressive worsening of left great toe ulceration for the past year now with 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  Patient stated that she has not followed up with Dr. Alvarado, has not been taking Plavix, and continued to smoke cigarettes.  Patient otherwise denied any fever chills chest pain palpitation PND orthopnea or shortness of breath.  On presentation, patient was tachycardic from vital signs were otherwise stable and patient was afebrile.  Workup in ED was unremarkable other than presence of hyperglycemia with blood glucose of 356. Patient will be admitted for further evaluation and intervention      Overview/Hospital Course:  07/08 OR today with Dr. Rivera for gangrenous left great toe.   07/09 Patient voices no complaints. Family at bedside. . Will add insulin sliding scale. Will add levemir 10 bid      Interval History: Patient voices no complaints. Family at bedside.    Review of Systems   Constitutional:  Negative for fatigue and fever.   Respiratory:  Negative for shortness of breath.    Cardiovascular:  Negative for chest pain.   Gastrointestinal:  Negative for abdominal distention.   Objective:     Vital Signs (Most Recent):  Temp: 98.1 °F (36.7 °C) (07/09/22 0701)  Pulse: 78 (07/09/22  0701)  Resp: 18 (07/09/22 0851)  BP: 135/62 (07/09/22 0701)  SpO2: 96 % (07/09/22 0701) Vital Signs (24h Range):  Temp:  [97.3 °F (36.3 °C)-98.8 °F (37.1 °C)] 98.1 °F (36.7 °C)  Pulse:  [78-98] 78  Resp:  [10-20] 18  SpO2:  [10 %-100 %] 96 %  BP: ()/(44-71) 135/62     Weight: 78.5 kg (173 lb)  Body mass index is 27.92 kg/m².    Intake/Output Summary (Last 24 hours) at 7/9/2022 0911  Last data filed at 7/8/2022 0948  Gross per 24 hour   Intake 1100 ml   Output 25 ml   Net 1075 ml      Physical Exam  Vitals and nursing note reviewed.   Constitutional:       Appearance: Normal appearance.   Cardiovascular:      Rate and Rhythm: Regular rhythm.      Heart sounds: Normal heart sounds.   Pulmonary:      Breath sounds: Normal breath sounds.   Abdominal:      Palpations: Abdomen is soft.   Neurological:      Mental Status: She is alert.       Significant Labs: All pertinent labs within the past 24 hours have been reviewed.  BMP:   Recent Labs   Lab 07/09/22 0319   *      K 4.5      CO2 31   BUN 6*   CREATININE 0.63   CALCIUM 8.2*     CBC:   Recent Labs   Lab 07/07/22 1719 07/08/22 0447 07/09/22 0319   WBC 11.29* 9.88 8.95   HGB 12.8 12.0 11.3*   HCT 39.3 36.7* 36.2*    224 198     CMP:   Recent Labs   Lab 07/07/22 1719 07/08/22 0447 07/09/22 0319   * 136 138   K 3.6 3.3* 4.5   CL 93* 99 101   CO2 27 30 31   * 124* 271*   BUN 6* 4* 6*   CREATININE 0.73 0.49* 0.63   CALCIUM 8.9 8.7 8.2*   PROT 7.3 6.3* 5.9*   ALBUMIN 3.1* 2.7* 2.5*   BILITOT 0.3 0.5 0.4   ALKPHOS 116 99 88   AST 13* 5* 8*   ALT 15 11* 12*   ANIONGAP 14 10 11   EGFRNONAA 86 136 102     POCT Glucose: No results for input(s): POCTGLUCOSE in the last 48 hours.    Significant Imaging:  none      Assessment/Plan:      * Gangrenous toe    Patient underwent fem-pop bypass in 3/21 for nonhealing L great toe ulcer, but patient never followed up with Dr. Alvarado.  Has not been compliant with medications including Plavix  and continued to smoke cigarettes    Unfortunately, the wound progressively became worse for the past year and patient decided come to the ED this evening for evaluation.    Patient was found to have dry gangrene of left great toe with auto amputation of distal phalanx.    Patient will require amputation, and Dr. Rivera has been consulted by ED provider      Coronary artery disease involving native coronary artery without angina pectoris    On stress test last year, patient was found to have a mild anterior wall ischemia with normal echocardiogram.  Decision was made to treat this patient with medications only.  However, patient has not been compliant with medication as prescribed.  Currently, patient offered no complaints.  Will resume home medications and monitor    Hyponatremia    Corrected sodium was 134. Will start patient on normal saline at 125 cc an hour      Cellulitis of left lower extremity    Patient was also found to have edematous and erythematous left foot and ankle.  Will empirically start patient on vancomycin and Zosyn      Essential hypertension    Continue present management      PAD (peripheral artery disease)    Patient had fem-pop bypass by  in 3/21.  However, patient has not been compliant with medication as prescribed and still continues to smoke cigarettes    On today's exam, DP and PT were not palpable.    Will proceed with CTA with runoff.  Will consult Dr. Alvarado      Diabetes mellitus type 2, uncontrolled    Patient's hemoglobin A1c was 12.8 today  Will start patient on sliding scale insulin q.4 hours to maintain CBGs in the range of 130s to 180s        Tobacco abuse    Patient continued to smoke in spite of having CAD and PAD.  Tobacco cessation was advised        VTE Risk Mitigation (From admission, onward)         Ordered     enoxaparin injection 40 mg  Daily         07/07/22 2045     IP VTE HIGH RISK PATIENT  Once         07/07/22 2045                Discharge Planning   DAVID:       Code Status: Full Code   Is the patient medically ready for discharge?:     Reason for patient still in hospital (select all that apply): Other (specify) IV antibiotics. Add nessa Carmona MD  Department of Hospital Medicine   [facility] - Orthopedic

## 2022-07-10 LAB
GLUCOSE SERPL-MCNC: 162 MG/DL (ref 70–105)
GLUCOSE SERPL-MCNC: 266 MG/DL (ref 70–105)
GLUCOSE SERPL-MCNC: 279 MG/DL (ref 70–105)
GLUCOSE SERPL-MCNC: 280 MG/DL (ref 70–105)
VANCOMYCIN TROUGH SERPL-MCNC: 4.7 ΜG/ML (ref 10–20)

## 2022-07-10 PROCEDURE — C9399 UNCLASSIFIED DRUGS OR BIOLOG: HCPCS | Performed by: FAMILY MEDICINE

## 2022-07-10 PROCEDURE — 11000001 HC ACUTE MED/SURG PRIVATE ROOM

## 2022-07-10 PROCEDURE — 27000221 HC OXYGEN, UP TO 24 HOURS

## 2022-07-10 PROCEDURE — 94761 N-INVAS EAR/PLS OXIMETRY MLT: CPT

## 2022-07-10 PROCEDURE — 99232 SBSQ HOSP IP/OBS MODERATE 35: CPT | Mod: ,,, | Performed by: FAMILY MEDICINE

## 2022-07-10 PROCEDURE — 82962 GLUCOSE BLOOD TEST: CPT

## 2022-07-10 PROCEDURE — 25000003 PHARM REV CODE 250: Performed by: SURGERY

## 2022-07-10 PROCEDURE — 99900035 HC TECH TIME PER 15 MIN (STAT)

## 2022-07-10 PROCEDURE — 25000003 PHARM REV CODE 250: Performed by: FAMILY MEDICINE

## 2022-07-10 PROCEDURE — 80202 ASSAY OF VANCOMYCIN: CPT | Performed by: SURGERY

## 2022-07-10 PROCEDURE — 99232 PR SUBSEQUENT HOSPITAL CARE,LEVL II: ICD-10-PCS | Mod: ,,, | Performed by: FAMILY MEDICINE

## 2022-07-10 PROCEDURE — 63600175 PHARM REV CODE 636 W HCPCS: Performed by: SURGERY

## 2022-07-10 PROCEDURE — 63600175 PHARM REV CODE 636 W HCPCS: Performed by: FAMILY MEDICINE

## 2022-07-10 PROCEDURE — S4991 NICOTINE PATCH NONLEGEND: HCPCS | Performed by: FAMILY MEDICINE

## 2022-07-10 RX ADMIN — FAMOTIDINE 20 MG: 20 TABLET, FILM COATED ORAL at 09:07

## 2022-07-10 RX ADMIN — FAMOTIDINE 20 MG: 20 TABLET, FILM COATED ORAL at 08:07

## 2022-07-10 RX ADMIN — ENOXAPARIN SODIUM 40 MG: 40 INJECTION SUBCUTANEOUS at 05:07

## 2022-07-10 RX ADMIN — INSULIN ASPART 1 UNITS: 100 INJECTION, SOLUTION INTRAVENOUS; SUBCUTANEOUS at 08:07

## 2022-07-10 RX ADMIN — INSULIN DETEMIR 15 UNITS: 100 INJECTION, SOLUTION SUBCUTANEOUS at 08:07

## 2022-07-10 RX ADMIN — MORPHINE SULFATE 2 MG: 2 INJECTION, SOLUTION INTRAMUSCULAR; INTRAVENOUS at 08:07

## 2022-07-10 RX ADMIN — MORPHINE SULFATE 2 MG: 2 INJECTION, SOLUTION INTRAMUSCULAR; INTRAVENOUS at 11:07

## 2022-07-10 RX ADMIN — PIPERACILLIN AND TAZOBACTAM 4.5 G: 4; .5 INJECTION, POWDER, FOR SOLUTION INTRAVENOUS at 01:07

## 2022-07-10 RX ADMIN — PIPERACILLIN AND TAZOBACTAM 4.5 G: 4; .5 INJECTION, POWDER, FOR SOLUTION INTRAVENOUS at 09:07

## 2022-07-10 RX ADMIN — PIPERACILLIN AND TAZOBACTAM 4.5 G: 4; .5 INJECTION, POWDER, FOR SOLUTION INTRAVENOUS at 05:07

## 2022-07-10 RX ADMIN — SODIUM CHLORIDE: 9 INJECTION, SOLUTION INTRAVENOUS at 05:07

## 2022-07-10 RX ADMIN — NICOTINE 1 PATCH: 21 PATCH, EXTENDED RELEASE TRANSDERMAL at 09:07

## 2022-07-10 RX ADMIN — LISINOPRIL 20 MG: 20 TABLET ORAL at 08:07

## 2022-07-10 RX ADMIN — HYDROCODONE BITARTRATE AND ACETAMINOPHEN 1 TABLET: 7.5; 325 TABLET ORAL at 11:07

## 2022-07-10 RX ADMIN — MORPHINE SULFATE 2 MG: 2 INJECTION, SOLUTION INTRAMUSCULAR; INTRAVENOUS at 05:07

## 2022-07-10 RX ADMIN — ATORVASTATIN CALCIUM 40 MG: 40 TABLET, FILM COATED ORAL at 09:07

## 2022-07-10 RX ADMIN — VANCOMYCIN HYDROCHLORIDE 1500 MG: 1 INJECTION, POWDER, LYOPHILIZED, FOR SOLUTION INTRAVENOUS at 08:07

## 2022-07-10 RX ADMIN — ASPIRIN 81 MG: 81 TABLET, COATED ORAL at 08:07

## 2022-07-10 NOTE — SUBJECTIVE & OBJECTIVE
Interval History: Patient voices no complaints.     Review of Systems   Constitutional:  Negative for fatigue and fever.   Respiratory:  Negative for shortness of breath.    Cardiovascular:  Negative for chest pain.   Skin:  Positive for wound.   Objective:     Vital Signs (Most Recent):  Temp: 98.5 °F (36.9 °C) (07/10/22 0400)  Pulse: 79 (07/10/22 0400)  Resp: 18 (07/10/22 0823)  BP: (!) 143/65 (07/10/22 0400)  SpO2: 96 % (07/10/22 0400)   Vital Signs (24h Range):  Temp:  [97.8 °F (36.6 °C)-98.9 °F (37.2 °C)] 98.5 °F (36.9 °C)  Pulse:  [79-95] 79  Resp:  [17-20] 18  SpO2:  [92 %-96 %] 96 %  BP: (100-154)/(64-65) 143/65     Weight: 78.5 kg (173 lb)  Body mass index is 27.92 kg/m².  No intake or output data in the 24 hours ending 07/10/22 0921   Physical Exam  Vitals and nursing note reviewed.   Constitutional:       Appearance: Normal appearance.   Cardiovascular:      Rate and Rhythm: Regular rhythm.      Heart sounds: Normal heart sounds.   Pulmonary:      Breath sounds: Normal breath sounds.   Abdominal:      Palpations: Abdomen is soft.   Skin:     Findings: Lesion present.   Neurological:      Mental Status: She is alert.       Significant Labs: All pertinent labs within the past 24 hours have been reviewed.  BMP:   Recent Labs   Lab 07/09/22 0319   *      K 4.5      CO2 31   BUN 6*   CREATININE 0.63   CALCIUM 8.2*     CBC:   Recent Labs   Lab 07/09/22 0319   WBC 8.95   HGB 11.3*   HCT 36.2*        CMP:   Recent Labs   Lab 07/09/22 0319      K 4.5      CO2 31   *   BUN 6*   CREATININE 0.63   CALCIUM 8.2*   PROT 5.9*   ALBUMIN 2.5*   BILITOT 0.4   ALKPHOS 88   AST 8*   ALT 12*   ANIONGAP 11   EGFRNONAA 102     POCT Glucose: No results for input(s): POCTGLUCOSE in the last 48 hours.    Significant Imaging: I have reviewed all pertinent imaging results/findings within the past 24 hours.  none

## 2022-07-10 NOTE — PROGRESS NOTES
[facility] Medicine  Progress Note    Patient Name: Leti Oquendo  MRN: [id number]  Patient Class: IP- Inpatient   Admission Date: 7/7/2022  Length of Stay: 3 days  Attending Physician: Girish Carmona MD  Primary Care Provider: Primary Doctor No        Subjective:     Principal Problem:Gangrenous toe        HPI:  Patient is a 61-year-old female with a history of poorly controlled type 2 diabetes complicated by neuropathic ulcers and peripheral arterial disease status post left fem-pop bypass year ago along with CAD with stress test a year ago revealing mild anterior wall ischemia ( treated with medications only ) who presents to emergency room with progressive worsening of left great toe ulceration for the past year now with 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  Patient stated that she has not followed up with Dr. Alvarado, has not been taking Plavix, and continued to smoke cigarettes.  Patient otherwise denied any fever chills chest pain palpitation PND orthopnea or shortness of breath.  On presentation, patient was tachycardic from vital signs were otherwise stable and patient was afebrile.  Workup in ED was unremarkable other than presence of hyperglycemia with blood glucose of 356. Patient will be admitted for further evaluation and intervention      Overview/Hospital Course:  07/08 OR today with Dr. Rivera for gangrenous left great toe.   07/09 Patient voices no complaints. Family at bedside. . Will add insulin sliding scale. Will add levemir 10 bid  07/10 Patient sitting up in bed. . Wound check per Surgery.       Interval History: Patient voices no complaints.     Review of Systems   Constitutional:  Negative for fatigue and fever.   Respiratory:  Negative for shortness of breath.    Cardiovascular:  Negative for chest pain.   Skin:  Positive for wound.   Objective:     Vital Signs (Most Recent):  Temp: 98.5 °F (36.9 °C) (07/10/22 0400)  Pulse:  79 (07/10/22 0400)  Resp: 18 (07/10/22 0823)  BP: (!) 143/65 (07/10/22 0400)  SpO2: 96 % (07/10/22 0400)   Vital Signs (24h Range):  Temp:  [97.8 °F (36.6 °C)-98.9 °F (37.2 °C)] 98.5 °F (36.9 °C)  Pulse:  [79-95] 79  Resp:  [17-20] 18  SpO2:  [92 %-96 %] 96 %  BP: (100-154)/(64-65) 143/65     Weight: 78.5 kg (173 lb)  Body mass index is 27.92 kg/m².  No intake or output data in the 24 hours ending 07/10/22 0921   Physical Exam  Vitals and nursing note reviewed.   Constitutional:       Appearance: Normal appearance.   Cardiovascular:      Rate and Rhythm: Regular rhythm.      Heart sounds: Normal heart sounds.   Pulmonary:      Breath sounds: Normal breath sounds.   Abdominal:      Palpations: Abdomen is soft.   Skin:     Findings: Lesion present.   Neurological:      Mental Status: She is alert.       Significant Labs: All pertinent labs within the past 24 hours have been reviewed.  BMP:   Recent Labs   Lab 07/09/22 0319   *      K 4.5      CO2 31   BUN 6*   CREATININE 0.63   CALCIUM 8.2*     CBC:   Recent Labs   Lab 07/09/22 0319   WBC 8.95   HGB 11.3*   HCT 36.2*        CMP:   Recent Labs   Lab 07/09/22 0319      K 4.5      CO2 31   *   BUN 6*   CREATININE 0.63   CALCIUM 8.2*   PROT 5.9*   ALBUMIN 2.5*   BILITOT 0.4   ALKPHOS 88   AST 8*   ALT 12*   ANIONGAP 11   EGFRNONAA 102     POCT Glucose: No results for input(s): POCTGLUCOSE in the last 48 hours.    Significant Imaging: I have reviewed all pertinent imaging results/findings within the past 24 hours.  none      Assessment/Plan:      * Gangrenous toe    Patient underwent fem-pop bypass in 3/21 for nonhealing L great toe ulcer, but patient never followed up with Dr. Alvarado.  Has not been compliant with medications including Plavix and continued to smoke cigarettes    Unfortunately, the wound progressively became worse for the past year and patient decided come to the ED this evening for evaluation.    Patient was  found to have dry gangrene of left great toe with auto amputation of distal phalanx.    Patient will require amputation, and Dr. Rivera has been consulted by ED provider      Coronary artery disease involving native coronary artery without angina pectoris    On stress test last year, patient was found to have a mild anterior wall ischemia with normal echocardiogram.  Decision was made to treat this patient with medications only.  However, patient has not been compliant with medication as prescribed.  Currently, patient offered no complaints.  Will resume home medications and monitor    Hyponatremia    Corrected sodium was 134. Will start patient on normal saline at 125 cc an hour      Cellulitis of left lower extremity    Patient was also found to have edematous and erythematous left foot and ankle.  Will empirically start patient on vancomycin and Zosyn      Essential hypertension    Continue present management      PAD (peripheral artery disease)    Patient had fem-pop bypass by  in 3/21.  However, patient has not been compliant with medication as prescribed and still continues to smoke cigarettes    On today's exam, DP and PT were not palpable.    Will proceed with CTA with runoff.  Will consult Dr. Alvarado      Diabetes mellitus type 2, uncontrolled    Patient's hemoglobin A1c was 12.8 today  Will start patient on sliding scale insulin q.4 hours to maintain CBGs in the range of 130s to 180s        Tobacco abuse    Patient continued to smoke in spite of having CAD and PAD.  Tobacco cessation was advised        VTE Risk Mitigation (From admission, onward)         Ordered     enoxaparin injection 40 mg  Daily         07/07/22 2045     IP VTE HIGH RISK PATIENT  Once         07/07/22 2045                Discharge Planning   DAVID:      Code Status: Full Code   Is the patient medically ready for discharge?:     Reason for patient still in hospital (select all that apply): Other (specify) Wound care                      Girish Carmona MD  Department of Hospital Medicine   [facility] - Orthopedic

## 2022-07-11 VITALS
WEIGHT: 173 LBS | BODY MASS INDEX: 27.8 KG/M2 | TEMPERATURE: 98 F | SYSTOLIC BLOOD PRESSURE: 117 MMHG | OXYGEN SATURATION: 93 % | HEIGHT: 66 IN | RESPIRATION RATE: 16 BRPM | HEART RATE: 79 BPM | DIASTOLIC BLOOD PRESSURE: 66 MMHG

## 2022-07-11 LAB
CULTURE, BONE: ABNORMAL
GLUCOSE SERPL-MCNC: 266 MG/DL (ref 70–105)

## 2022-07-11 PROCEDURE — 63600175 PHARM REV CODE 636 W HCPCS: Performed by: SURGERY

## 2022-07-11 PROCEDURE — 25000003 PHARM REV CODE 250: Performed by: SURGERY

## 2022-07-11 PROCEDURE — 82962 GLUCOSE BLOOD TEST: CPT

## 2022-07-11 PROCEDURE — 99239 PR HOSPITAL DISCHARGE DAY,>30 MIN: ICD-10-PCS | Mod: ,,, | Performed by: FAMILY MEDICINE

## 2022-07-11 PROCEDURE — 99239 HOSP IP/OBS DSCHRG MGMT >30: CPT | Mod: ,,, | Performed by: FAMILY MEDICINE

## 2022-07-11 RX ORDER — LOSARTAN POTASSIUM 50 MG/1
50 TABLET ORAL DAILY
Status: DISCONTINUED | OUTPATIENT
Start: 2022-07-11 | End: 2022-07-11

## 2022-07-11 RX ORDER — IBUPROFEN 200 MG
1 TABLET ORAL DAILY
Qty: 30 PATCH | Refills: 0 | Status: SHIPPED | OUTPATIENT
Start: 2022-07-11

## 2022-07-11 RX ORDER — LOSARTAN POTASSIUM 100 MG/1
100 TABLET ORAL DAILY
Status: DISCONTINUED | OUTPATIENT
Start: 2022-07-11 | End: 2022-07-11 | Stop reason: HOSPADM

## 2022-07-11 RX ORDER — ASPIRIN 81 MG/1
81 TABLET ORAL DAILY
Qty: 100 TABLET | Refills: 0 | Status: SHIPPED | OUTPATIENT
Start: 2022-07-11

## 2022-07-11 RX ORDER — CLOPIDOGREL BISULFATE 75 MG/1
75 TABLET ORAL DAILY
Status: DISCONTINUED | OUTPATIENT
Start: 2022-07-11 | End: 2022-07-11 | Stop reason: HOSPADM

## 2022-07-11 RX ORDER — HYDROCODONE BITARTRATE AND ACETAMINOPHEN 7.5; 325 MG/1; MG/1
1 TABLET ORAL EVERY 6 HOURS PRN
Qty: 28 TABLET | Refills: 0 | Status: SHIPPED | OUTPATIENT
Start: 2022-07-11

## 2022-07-11 RX ORDER — LOSARTAN POTASSIUM 100 MG/1
100 TABLET ORAL DAILY
Qty: 90 TABLET | Refills: 3 | Status: SHIPPED | OUTPATIENT
Start: 2022-07-11 | End: 2023-07-11

## 2022-07-11 RX ORDER — ATORVASTATIN CALCIUM 40 MG/1
40 TABLET, FILM COATED ORAL DAILY
Qty: 90 TABLET | Refills: 3 | Status: SHIPPED | OUTPATIENT
Start: 2022-07-11 | End: 2023-07-11

## 2022-07-11 RX ORDER — FAMOTIDINE 20 MG/1
20 TABLET, FILM COATED ORAL 2 TIMES DAILY
Qty: 60 TABLET | Refills: 11 | Status: SHIPPED | OUTPATIENT
Start: 2022-07-11 | End: 2023-07-11

## 2022-07-11 RX ADMIN — HYDROCODONE BITARTRATE AND ACETAMINOPHEN 1 TABLET: 7.5; 325 TABLET ORAL at 03:07

## 2022-07-11 RX ADMIN — PIPERACILLIN AND TAZOBACTAM 4.5 G: 4; .5 INJECTION, POWDER, FOR SOLUTION INTRAVENOUS at 02:07

## 2022-07-11 RX ADMIN — HYDROCODONE BITARTRATE AND ACETAMINOPHEN 1 TABLET: 7.5; 325 TABLET ORAL at 02:07

## 2022-07-11 NOTE — PLAN OF CARE
[facility] - Orthopedic  Initial Discharge Assessment       Primary Care Provider: Primary Doctor No    Admission Diagnosis: Hyponatremia [E87.1]  Tobacco abuse [Z72.0]  Wound infection [T14.8XXA, L08.9]  Gangrenous toe [I96]  PAD (peripheral artery disease) [I73.9]  Cellulitis of left lower extremity [L03.116]  Essential hypertension [I10]  Chest pain [R07.9]  Coronary artery disease involving native coronary artery of native heart without angina pectoris [I25.10]  Uncontrolled type 2 diabetes mellitus with hyperglycemia [E11.65]    Admission Date: 7/7/2022  Expected Discharge Date: 7/11/2022    Discharge Barriers Identified: None    Payor: [employer] / Plan: [employer] / Product Type: Government /     Extended Emergency Contact Information  Primary Emergency Contact: LURDES HUGGINS  Mobile Phone: [phone]  Relation: Son   needed? No    Discharge Plan A: Home Health  Discharge Plan B: Home      [facility] [place] [address] [place] [address]  [address] [place] [id number]  Phone: [phone] Fax: [phone]      Initial Assessment (most recent)     Adult Discharge Assessment - 07/11/22 1523        Discharge Assessment    Assessment Type Discharge Planning Assessment     Source of Information family     Lives With alone     Do you expect to return to your current living situation? Yes     Do you have help at home or someone to help you manage your care at home? No     Prior to hospitilization cognitive status: Alert/Oriented     Current cognitive status: Alert/Oriented     Equipment Currently Used at Home shower chair     Patient currently being followed by outpatient case management? No     Do you currently have service(s) that help you manage your care at home? No     Do you take prescription medications? Yes     Do you have prescription coverage? Yes     Coverage [facility]     Do you have any problems affording any of your prescribed medications? No      Is the patient taking medications as prescribed? yes     Who is going to help you get home at discharge? Malcolm Frankel- Son     How do you get to doctors appointments? family or friend will provide     Are you on dialysis? No     Do you take coumadin? No     Discharge Plan A Home Health     Discharge Plan B Home     DME Needed Upon Discharge  none     Discharge Plan discussed with: Adult children     Discharge Barriers Identified None                    [facility] - Orthopedic  Discharge Final Note    Primary Care Provider: Primary Doctor No    Expected Discharge Date: 7/11/2022    Final Discharge Note (most recent)     Final Note - 07/11/22 1533        Final Note    Assessment Type Final Discharge Note     Anticipated Discharge Disposition Home-Health Care Svc   [facility] at Home    What phone number can be called within the next 1-3 days to see how you are doing after discharge? [phone]        Post-Acute Status    Post-Acute Authorization Home Health     Home Health Status Referrals Sent     Patient choice form signed by patient/caregiver List with quality metrics by geographic area provided;List from CMS Compare     Discharge Delays None known at this time                 Important Message from Medicare             Contact Info     Mj Rivera MD   Specialty: General Surgery, Surgery    [address] [place] [address]   Phone: [phone]       Next Steps: Go on 7/25/2022    Instructions: please follow up on July 25 at 1:30           VIDYA consulted to set up [facility] appointment for wound care, post op boot and hh. VIDYA spoke with pt and son. Pt lives at home alone,not current with hh and uses a shower chair. VIDYA informed nurse and NP that SW does not set up appointments and nurse wound have to arrange. Pt needs post op boot and it is on floor so that nurse can get to pt. VIDYA received choice for [facility]. Pt is [facility] and sees the [facility] in [place]. VIDYA faxed referral to [facility] and [facility]. Once the  [facility] has [facility], Jesse will be able to go out and see pt. SW informed, NP, nurse, pt and son. Pt to dc home today, no other needs.

## 2022-07-11 NOTE — DISCHARGE SUMMARY
[facility] Medicine  Discharge Summary      Patient Name: Leti Oquendo  MRN: [id number]  Patient Class: IP- Inpatient  Admission Date: 7/7/2022  Hospital Length of Stay: 4 days  Discharge Date and Time:  07/11/2022 7:50 AM  Attending Physician: Girish Carmona MD   Discharging Provider: Girish Carmona MD  Primary Care Provider: Primary Doctor No      HPI:   Patient is a 61-year-old female with a history of poorly controlled type 2 diabetes complicated by neuropathic ulcers and peripheral arterial disease status post left fem-pop bypass year ago along with CAD with stress test a year ago revealing mild anterior wall ischemia ( treated with medications only ) who presents to emergency room with progressive worsening of left great toe ulceration for the past year now with 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  Patient stated that she has not followed up with Dr. Alvarado, has not been taking Plavix, and continued to smoke cigarettes.  Patient otherwise denied any fever chills chest pain palpitation PND orthopnea or shortness of breath.  On presentation, patient was tachycardic from vital signs were otherwise stable and patient was afebrile.  Workup in ED was unremarkable other than presence of hyperglycemia with blood glucose of 356. Patient will be admitted for further evaluation and intervention      Procedure(s) (LRB):  left graet and 2nd toe (Left)      Hospital Course:   07/08 OR today with Dr. Rivera for gangrenous left great toe.   07/09 Patient voices no complaints. Family at bedside. . Will add insulin sliding scale. Will add levemir 10 bid  07/10 Patient sitting up in bed. . Wound check per Surgery.   07/11 Patient voices no complaints. DC today with wound check in 10 days with Dr. Rivera. DC with Nicotine patch. Stop smoking.        Goals of Care Treatment Preferences:  Code Status: Full Code      Consults:   Consults (From admission, onward)         Status Ordering Provider     Inpatient consult to Vascular Surgery  Once        Provider:  Alisha Alvarado MD    Acknowledged CHAR RIVERA     Inpatient consult to General Surgery  Once        Provider:  Char Rivera MD    Acknowledged CHAR RIVERA     Pharmacy to dose Vancomycin consult  Once        Provider:  (Not yet assigned)    Completed CHAR RIVERA          No new Assessment & Plan notes have been filed under this hospital service since the last note was generated.  Service: Hospital Medicine    Final Active Diagnoses:    Diagnosis Date Noted POA    PRINCIPAL PROBLEM:  Gangrenous toe [I96] 03/31/2021 Yes    PAD (peripheral artery disease) [I73.9] 07/07/2022 Yes    Essential hypertension [I10] 07/07/2022 Yes    Cellulitis of left lower extremity [L03.116] 07/07/2022 Yes    Hyponatremia [E87.1] 07/07/2022 Yes    Coronary artery disease involving native coronary artery without angina pectoris [I25.10] 07/07/2022 Unknown    Diabetes mellitus type 2, uncontrolled [E11.65] 04/01/2021 Yes    Tobacco abuse [Z72.0] 03/31/2021 Yes      Problems Resolved During this Admission:    Diagnosis Date Noted Date Resolved POA    Cellulitis of right lower extremity [L03.115] 07/07/2022 07/07/2022 Yes       Discharged Condition: good    Disposition: Home or Self Care    Follow Up:    Patient Instructions:      Diet Adult Regular     Activity as tolerated       Significant Diagnostic Studies: none    Pending Diagnostic Studies:     Procedure Component Value Units Date/Time    EXTRA TUBES [[id number]] Collected: 07/07/22 1719    Order Status: Sent Lab Status: In process Updated: 07/07/22 1903    Specimen: Blood, Venous     Narrative:      The following orders were created for panel order EXTRA TUBES.  Procedure                               Abnormality         Status                     ---------                               -----------         ------                     Light Green Top Hold[[id number]]                              In process                 Lavender Top Hold[[id number]]                                Final result                 Please view results for these tests on the individual orders.    Occult blood x 1, stool [[id number]]     Order Status: Sent Lab Status: No result     Specimen: Stool     US Lower Extrem Arteries Bilat with RAHEEM (xpd) [[id number]]     Order Status: Sent Lab Status: No result     US Lower Extrem Arteries Bilat with RAHEEM (xpd) [[id number]]     Order Status: Sent Lab Status: No result          Medications:  Reconciled Home Medications:      Medication List      START taking these medications    atorvastatin 40 MG tablet  Commonly known as: LIPITOR  Take 1 tablet (40 mg total) by mouth once daily.  Replaces: lovastatin 40 MG tablet     HYDROcodone-acetaminophen 7.5-325 mg per tablet  Commonly known as: NORCO  Take 1 tablet by mouth every 6 (six) hours as needed for Pain.  Replaces: HYDROcodone-acetaminophen 5-325 mg per tablet     losartan 100 MG tablet  Commonly known as: COZAAR  Take 1 tablet (100 mg total) by mouth once daily.     nicotine 21 mg/24 hr  Commonly known as: NICODERM CQ  Place 1 patch onto the skin once daily.        CHANGE how you take these medications    famotidine 20 MG tablet  Commonly known as: PEPCID  Take 1 tablet (20 mg total) by mouth 2 (two) times daily.  What changed:   · when to take this  · reasons to take this        CONTINUE taking these medications    aspirin 81 MG EC tablet  Commonly known as: ECOTRIN  Take 1 tablet (81 mg total) by mouth once daily.     glipiZIDE 10 MG tablet  Commonly known as: GLUCOTROL  Take 10 mg by mouth daily with breakfast.     metFORMIN 1000 MG tablet  Commonly known as: GLUCOPHAGE  Take 1,000 mg by mouth 2 (two) times daily with meals.        STOP taking these medications    HYDROcodone-acetaminophen 5-325 mg per tablet  Commonly known as: NORCO  Replaced by: HYDROcodone-acetaminophen 7.5-325 mg per tablet      lisinopriL-hydrochlorothiazide 20-25 mg Tab  Commonly known as: PRINZIDE,ZESTORETIC     lovastatin 40 MG tablet  Commonly known as: MEVACOR  Replaced by: atorvastatin 40 MG tablet            Indwelling Lines/Drains at time of discharge:   Lines/Drains/Airways     None                 Time spent on the discharge of patient: 35 minutes         Girish Carmona MD  Department of Hospital Medicine  [facility] - Orthopedic

## 2022-07-11 NOTE — NURSING
Discharge instructions reviewed with patient and spouse; and copy given to patient. Patient and spouse voiced understanding regarding:meds, appt., signs and symptoms to report to physician.  As well as, the following,Hospitalist Discharge orders  *Notify your healthcare provider if you experience any of the following: temperature >100.4  * Notify your healthcare provider if you experience any of the following: redness, tenderness.    *Notify your healthcare provider if you experience any of the following: difficulty breathing or     increased cough.  *Notify your physician if you experience any persistent nausea, vomiting, or diarrhea or headache  *Notify your physician if you experience any of the following:severe uncontrolled pain;worsening rash, increased confusion or weakness; dizziness, lightheadedness or visual disturbances

## 2022-07-11 NOTE — PLAN OF CARE
Problem: Adult Inpatient Plan of Care  Goal: Plan of Care Review  Outcome: Ongoing, Progressing  Goal: Patient-Specific Goal (Individualized)  Outcome: Ongoing, Progressing  Goal: Absence of Hospital-Acquired Illness or Injury  Outcome: Ongoing, Progressing  Goal: Optimal Comfort and Wellbeing  Outcome: Ongoing, Progressing  Goal: Readiness for Transition of Care  Outcome: Ongoing, Progressing     Problem: Diabetes Comorbidity  Goal: Blood Glucose Level Within Targeted Range  Outcome: Ongoing, Progressing     Problem: Impaired Wound Healing  Goal: Optimal Wound Healing  Outcome: Ongoing, Progressing     Problem: Pain Acute  Goal: Acceptable Pain Control and Functional Ability  Outcome: Ongoing, Progressing     Problem: Skin Injury Risk Increased  Goal: Skin Health and Integrity  Outcome: Ongoing, Progressing     Problem: Fall Injury Risk  Goal: Absence of Fall and Fall-Related Injury  Outcome: Ongoing, Progressing

## 2022-07-11 NOTE — CONSULTS
Trough was low. Frequency of Vancomycin adjusted. Pharmacy to continue to follow and manage. Next trough scheduled for 7/13 PM.

## 2022-07-11 NOTE — DISCHARGE INSTRUCTIONS
, dressing change clean with vashe, or antibacterial soap/water any open areas moistened gauze with saline or vashe wet to dry [facility]      Hospitalist Discharge orders  *Notify your healthcare provider if you experience any of the following: temperature >100.4  * Notify your healthcare provider if you experience any of the following: redness, tenderness.    *Notify your healthcare provider if you experience any of the following: difficulty breathing or     increased cough.  *Notify your physician if you experience any persistent nausea, vomiting, or diarrhea or headache  *Notify your physician if you experience any of the following:severe uncontrolled pain;worsening rash, increased confusion or weakness; dizziness, lightheadedness or visual disturbances

## 2022-07-12 ENCOUNTER — TELEPHONE (OUTPATIENT)
Dept: ORTHOPEDICS | Facility: HOSPITAL | Age: 62
End: 2022-07-12
Payer: OTHER GOVERNMENT

## 2022-07-12 LAB — MICROORGANISM SPEC CULT: ABNORMAL

## 2022-07-12 NOTE — TELEPHONE ENCOUNTER
Patient states doing well, [facility]. No complaints at this time

## 2022-07-13 LAB
BACTERIA BLD CULT: NORMAL
BACTERIA BLD CULT: NORMAL
ESTROGEN SERPL-MCNC: NORMAL PG/ML
INSULIN SERPL-ACNC: NORMAL U[IU]/ML
LAB AP GROSS DESCRIPTION: NORMAL
LAB AP LABORATORY NOTES: NORMAL
T3RU NFR SERPL: NORMAL %

## 2022-07-14 ENCOUNTER — TELEPHONE (OUTPATIENT)
Dept: ORTHOPEDICS | Facility: HOSPITAL | Age: 62
End: 2022-07-14
Payer: OTHER GOVERNMENT

## 2022-07-14 NOTE — ANESTHESIA POSTPROCEDURE EVALUATION
Anesthesia Post Evaluation    Patient: Leti Oquendo    Procedure(s) Performed: Procedure(s) (LRB):  left graet and 2nd toe (Left)    Final Anesthesia Type: general      Patient location during evaluation: PACU  Patient participation: Yes- Able to Participate  Level of consciousness: awake and alert  Post-procedure vital signs: reviewed and stable  Pain management: adequate  Airway patency: patent  CHESTER mitigation strategies: Multimodal analgesia  PONV status at discharge: No PONV  Anesthetic complications: no      Cardiovascular status: blood pressure returned to baseline  Respiratory status: unassisted  Hydration status: euvolemic  Follow-up not needed.          Vitals Value Taken Time   /66 07/11/22 0400   Temp 36.6 °C (97.9 °F) 07/11/22 0400   Pulse 79 07/11/22 0400   Resp 16 07/11/22 1542   SpO2 93 % 07/11/22 0400         Event Time   Out of Recovery 07/08/2022 10:22:53         Pain/Hill Score: No data recorded

## 2022-07-15 LAB — BACTERIA SPEC ANAEROBE CULT: ABNORMAL

## 2022-07-29 ENCOUNTER — OFFICE VISIT (OUTPATIENT)
Dept: SURGERY | Facility: CLINIC | Age: 62
End: 2022-07-29
Attending: SURGERY
Payer: OTHER GOVERNMENT

## 2022-07-29 DIAGNOSIS — T87.89 DELAYED SURGICAL WOUND HEALING OF TOE AMPUTATION STUMP: ICD-10-CM

## 2022-07-29 DIAGNOSIS — Z09 POSTOP CHECK: Primary | ICD-10-CM

## 2022-07-29 PROCEDURE — 99024 POSTOP FOLLOW-UP VISIT: CPT | Mod: ,,, | Performed by: SURGERY

## 2022-07-29 PROCEDURE — 99213 OFFICE O/P EST LOW 20 MIN: CPT | Mod: PBBFAC | Performed by: SURGERY

## 2022-07-29 PROCEDURE — 99024 PR POST-OP FOLLOW-UP VISIT: ICD-10-PCS | Mod: ,,, | Performed by: SURGERY

## 2022-07-29 RX ORDER — HYDROCODONE BITARTRATE AND ACETAMINOPHEN 7.5; 325 MG/1; MG/1
1 TABLET ORAL EVERY 6 HOURS PRN
Qty: 24 TABLET | Refills: 0 | Status: SHIPPED | OUTPATIENT
Start: 2022-07-29

## 2022-08-08 PROBLEM — Z09 POSTOP CHECK: Status: ACTIVE | Noted: 2022-08-08

## 2022-08-08 PROBLEM — T87.89 DELAYED SURGICAL WOUND HEALING OF TOE AMPUTATION STUMP: Status: ACTIVE | Noted: 2022-08-08

## 2022-08-08 NOTE — PROGRESS NOTES
Patient returns after amputation of great toe and 2nd toe, left foot.  She denies problems.  On exam, her incision is healing okay.  A single nylon suture was removed.  Mild amount of fibrin present.  No purulent fluid.    Return for possible debridement and recheck in a few weeks.

## 2022-08-12 ENCOUNTER — OFFICE VISIT (OUTPATIENT)
Dept: SURGERY | Facility: CLINIC | Age: 62
End: 2022-08-12
Attending: SURGERY
Payer: OTHER GOVERNMENT

## 2022-08-12 VITALS — BODY MASS INDEX: 27.32 KG/M2 | HEIGHT: 66 IN | WEIGHT: 170 LBS

## 2022-08-12 DIAGNOSIS — Z09 POSTOP CHECK: Primary | ICD-10-CM

## 2022-08-12 DIAGNOSIS — T87.89 DELAYED SURGICAL WOUND HEALING OF TOE AMPUTATION STUMP: ICD-10-CM

## 2022-08-12 PROCEDURE — 99211 OFF/OP EST MAY X REQ PHY/QHP: CPT | Mod: S$PBB,,, | Performed by: SURGERY

## 2022-08-12 PROCEDURE — 99213 OFFICE O/P EST LOW 20 MIN: CPT | Mod: PBBFAC | Performed by: SURGERY

## 2022-08-12 PROCEDURE — 99211 PR OFFICE/OUTPT VISIT, EST, LEVL I: ICD-10-PCS | Mod: S$PBB,,, | Performed by: SURGERY

## 2022-08-12 NOTE — PROGRESS NOTES
"Subjective:       Patient ID: Leti Oquendo is a 61 y.o. female.    Chief Complaint: Follow-up    Patient had toe amputations a few weeks ago.  She has a wound that is slowly healing.      Follow-up        family history includes Heart disease in her father; Hypertension in her father and mother.  Past Medical History:   Diagnosis Date    Diabetes mellitus     Hypertension       Past Surgical History:   Procedure Laterality Date    CREATION OF FEMOROPOPLITEAL ARTERIAL BYPASS USING GRAFT Left 4/2/2021    Procedure: CREATION, BYPASS, ARTERIAL, FEMORAL TO POPLITEAL, USING GRAFT;  Surgeon: Alisha Alvarado MD;  Location: [facility];  Service: General;  Laterality: Left;    TOE AMPUTATION Left 7/8/2022    Procedure: left graet and 2nd toe;  Surgeon: Mj Rivera MD;  Location: [facility];  Service: General;  Laterality: Left;       reports that she has been smoking. She has been smoking about 2.00 packs per day. She has quit using smokeless tobacco. She reports that she does not drink alcohol and does not use drugs.     Review of Systems   All other systems reviewed and are negative.         Objective:      Ht 5' 6" (1.676 m)   Wt 77.1 kg (170 lb)   BMI 27.44 kg/m²    Physical Exam  Cardiovascular:      Rate and Rhythm: Normal rate.      Pulses: Normal pulses.      Heart sounds: No murmur heard.  Pulmonary:      Effort: No respiratory distress.   Skin:     Comments: Left foot toe amp Wound medially located, measuring 3cm x 0.5cm x 0.3cm;  Debrided fibrin, slough, exudate with curette.    No purulent fluid or necrosis   Neurological:      Mental Status: She is alert.           Assessment/Plan:         Postop check    Delayed surgical wound healing of toe amputation stump         Problem List Items Addressed This Visit        Orthopedic    Delayed surgical wound healing of toe amputation stump    Current Assessment & Plan     Continue current regimen with dressing changes and offloading  F/u in 2-4 weeks        "       Other    Postop check - Primary

## 2022-09-16 ENCOUNTER — OFFICE VISIT (OUTPATIENT)
Dept: SURGERY | Facility: CLINIC | Age: 62
End: 2022-09-16
Attending: SURGERY
Payer: OTHER GOVERNMENT

## 2022-09-16 DIAGNOSIS — Z09 POSTOP CHECK: Primary | ICD-10-CM

## 2022-09-16 DIAGNOSIS — T87.89 DELAYED SURGICAL WOUND HEALING OF TOE AMPUTATION STUMP: ICD-10-CM

## 2022-09-16 PROCEDURE — 99211 OFF/OP EST MAY X REQ PHY/QHP: CPT | Mod: S$PBB,,, | Performed by: SURGERY

## 2022-09-16 PROCEDURE — 99211 PR OFFICE/OUTPT VISIT, EST, LEVL I: ICD-10-PCS | Mod: S$PBB,,, | Performed by: SURGERY

## 2022-09-16 PROCEDURE — 99213 OFFICE O/P EST LOW 20 MIN: CPT | Mod: PBBFAC | Performed by: SURGERY

## 2022-09-16 NOTE — PROGRESS NOTES
Subjective:       Patient ID: Leti Oquendo is a 61 y.o. female.    Chief Complaint: Post-op Evaluation    Patient returns today for follow-up after having toe amputations from the left foot several weeks ago.  She describes no problems.  She is performing wet-to-dry dressings with Vashe.  She believes that the wound is smaller and improving.    family history includes Heart disease in her father; Hypertension in her father and mother.  Past Medical History:   Diagnosis Date    Diabetes mellitus     Hypertension       Past Surgical History:   Procedure Laterality Date    CREATION OF FEMOROPOPLITEAL ARTERIAL BYPASS USING GRAFT Left 4/2/2021    Procedure: CREATION, BYPASS, ARTERIAL, FEMORAL TO POPLITEAL, USING GRAFT;  Surgeon: Alisha Alvarado MD;  Location: [facility] OR;  Service: General;  Laterality: Left;    TOE AMPUTATION Left 7/8/2022    Procedure: left graet and 2nd toe;  Surgeon: Mj Rivera MD;  Location: [facility] OR;  Service: General;  Laterality: Left;       reports that she has been smoking. She has been smoking an average of 2 packs per day. She has quit using smokeless tobacco. She reports that she does not drink alcohol and does not use drugs.     Review of Systems   All other systems reviewed and are negative.       Objective:      There were no vitals taken for this visit.   Physical Exam  Cardiovascular:      Rate and Rhythm: Normal rate.   Pulmonary:      Effort: Pulmonary effort is normal.   Abdominal:      Palpations: Abdomen is soft.   Skin:     Comments: Today the wound measures 0.5 cm x 0.9 cm x 0.3 cm.  There is fibrin slough and exudate that was debrided with a curette.  Pressure was held for hemostasis.   Neurological:      General: No focal deficit present.      Mental Status: She is alert.       Assessment/Plan:         Postop check         Problem List Items Addressed This Visit          Other    Postop check - Primary

## 2022-10-21 ENCOUNTER — OFFICE VISIT (OUTPATIENT)
Dept: SURGERY | Facility: CLINIC | Age: 62
End: 2022-10-21
Attending: SURGERY
Payer: OTHER GOVERNMENT

## 2022-10-21 DIAGNOSIS — Z09 POSTOP CHECK: ICD-10-CM

## 2022-10-21 DIAGNOSIS — T87.89 DELAYED SURGICAL WOUND HEALING OF TOE AMPUTATION STUMP: Primary | ICD-10-CM

## 2022-10-21 PROCEDURE — 99212 OFFICE O/P EST SF 10 MIN: CPT | Mod: S$PBB,,, | Performed by: SURGERY

## 2022-10-21 PROCEDURE — 99212 PR OFFICE/OUTPT VISIT, EST, LEVL II, 10-19 MIN: ICD-10-PCS | Mod: S$PBB,,, | Performed by: SURGERY

## 2022-10-21 PROCEDURE — 99213 OFFICE O/P EST LOW 20 MIN: CPT | Mod: PBBFAC | Performed by: SURGERY

## 2022-10-28 NOTE — PROGRESS NOTES
Subjective:       Patient ID: Leti Oquendo is a 62 y.o. female.    Chief Complaint: Wound Check    Patient returns today for follow-up after having toe amputations from the left foot several weeks ago.  She describes no problems.  She is performing wet-to-dry dressings with Vashe.  She believes that the wound might be healed.     Wound Check    family history includes Heart disease in her father; Hypertension in her father and mother.  Past Medical History:   Diagnosis Date    Diabetes mellitus     Hypertension       Past Surgical History:   Procedure Laterality Date    CREATION OF FEMOROPOPLITEAL ARTERIAL BYPASS USING GRAFT Left 4/2/2021    Procedure: CREATION, BYPASS, ARTERIAL, FEMORAL TO POPLITEAL, USING GRAFT;  Surgeon: Alisha Alvarado MD;  Location: [facility];  Service: General;  Laterality: Left;    TOE AMPUTATION Left 7/8/2022    Procedure: left graet and 2nd toe;  Surgeon: Mj Rivera MD;  Location: [facility];  Service: General;  Laterality: Left;       reports that she has been smoking. She has been smoking an average of 2 packs per day. She has quit using smokeless tobacco. She reports that she does not drink alcohol and does not use drugs.     Review of Systems   All other systems reviewed and are negative.       Objective:      There were no vitals taken for this visit.   Physical Exam  Cardiovascular:      Rate and Rhythm: Normal rate.   Pulmonary:      Effort: Pulmonary effort is normal.   Abdominal:      Palpations: Abdomen is soft.   Musculoskeletal:         General: No swelling.   Skin:     Comments: Callous with tiny residual wound 0.4cm x 0.7cm x 0.2   Neurological:      General: No focal deficit present.      Mental Status: She is alert.   Psychiatric:         Mood and Affect: Mood normal.       Assessment/Plan:         Delayed surgical wound healing of toe amputation stump    Postop check       Problem List Items Addressed This Visit          Orthopedic    Delayed surgical wound healing  of toe amputation stump - Primary       Other    Postop check    Current Assessment & Plan     continue current regimen    F/u in 2-3 weeks

## 2022-11-07 PROBLEM — Z09 POSTOP CHECK: Status: RESOLVED | Noted: 2022-08-08 | Resolved: 2022-11-07

## 2022-11-11 ENCOUNTER — OFFICE VISIT (OUTPATIENT)
Dept: SURGERY | Facility: CLINIC | Age: 62
End: 2022-11-11
Attending: SURGERY
Payer: OTHER GOVERNMENT

## 2022-11-11 DIAGNOSIS — T87.89 DELAYED SURGICAL WOUND HEALING OF TOE AMPUTATION STUMP: Primary | ICD-10-CM

## 2022-11-11 PROCEDURE — 99211 PR OFFICE/OUTPT VISIT, EST, LEVL I: ICD-10-PCS | Mod: S$PBB,,, | Performed by: SURGERY

## 2022-11-11 PROCEDURE — 99213 OFFICE O/P EST LOW 20 MIN: CPT | Mod: PBBFAC | Performed by: SURGERY

## 2022-11-11 PROCEDURE — 99211 OFF/OP EST MAY X REQ PHY/QHP: CPT | Mod: S$PBB,,, | Performed by: SURGERY

## 2022-11-11 NOTE — ASSESSMENT & PLAN NOTE
Toe amputation site has healed on today's visit.    She does not have to use the walking boot any longer.      I recommend an orthotic prosthesis for her left shoe, given the 2 amputated toes  Patient will see me in 1 month just to follow-up on her foot wound to ensure that there is no breakdown following discontinuation of the boot.

## 2022-11-11 NOTE — PROGRESS NOTES
Subjective:       Patient ID: Leti Oquendo is a 62 y.o. female.    Chief Complaint: Follow-up (F/u )    Patient returns today for follow-up after having toe amputations from the left foot several weeks ago.  She describes no problems.  She is performing wet-to-dry dressings with Vashe.  She believes that the wound might be healed.     Follow-up    Wound Check    family history includes Heart disease in her father; Hypertension in her father and mother.  Past Medical History:   Diagnosis Date    Diabetes mellitus     Hypertension       Past Surgical History:   Procedure Laterality Date    CREATION OF FEMOROPOPLITEAL ARTERIAL BYPASS USING GRAFT Left 4/2/2021    Procedure: CREATION, BYPASS, ARTERIAL, FEMORAL TO POPLITEAL, USING GRAFT;  Surgeon: Alisha Alvarado MD;  Location: [facility];  Service: General;  Laterality: Left;    TOE AMPUTATION Left 7/8/2022    Procedure: left graet and 2nd toe;  Surgeon: Mj Rivera MD;  Location: [facility];  Service: General;  Laterality: Left;       reports that she has quit smoking. Her smoking use included cigarettes. She smoked an average of 2 packs per day. She has quit using smokeless tobacco. She reports that she does not drink alcohol and does not use drugs.     Review of Systems       Objective:      There were no vitals taken for this visit.   Physical Exam  Cardiovascular:      Rate and Rhythm: Normal rate.   Pulmonary:      Effort: Pulmonary effort is normal.   Abdominal:      Palpations: Abdomen is soft.   Musculoskeletal:         General: No swelling.   Skin:     Comments: Small residual fibrin scab at amputation site 2 mm x 1.2 mm.  This was not disrupted, to allow continued healing and remodeling.  No sign of infection   Neurological:      General: No focal deficit present.      Mental Status: She is alert.   Psychiatric:         Mood and Affect: Mood normal.       Assessment/Plan:         Delayed surgical wound healing of toe amputation stump       Problem List  Items Addressed This Visit          Orthopedic    Delayed surgical wound healing of toe amputation stump - Primary    Overview     Healed         Current Assessment & Plan     Toe amputation site has healed on today's visit.    She does not have to use the walking boot any longer.      I recommend an orthotic prosthesis for her left shoe, given the 2 amputated toes    Patient will see me in 1 month just to follow-up on her foot wound to ensure that there is no breakdown following discontinuation of the boot.

## 2022-12-30 ENCOUNTER — OFFICE VISIT (OUTPATIENT)
Dept: SURGERY | Facility: CLINIC | Age: 62
End: 2022-12-30
Attending: SURGERY
Payer: OTHER GOVERNMENT

## 2022-12-30 DIAGNOSIS — T87.89 DELAYED SURGICAL WOUND HEALING OF TOE AMPUTATION STUMP: Primary | ICD-10-CM

## 2022-12-30 PROCEDURE — 99211 PR OFFICE/OUTPT VISIT, EST, LEVL I: ICD-10-PCS | Mod: S$PBB,,, | Performed by: SURGERY

## 2022-12-30 PROCEDURE — 99211 OFF/OP EST MAY X REQ PHY/QHP: CPT | Mod: S$PBB,,, | Performed by: SURGERY

## 2022-12-30 PROCEDURE — 99213 OFFICE O/P EST LOW 20 MIN: CPT | Mod: PBBFAC | Performed by: SURGERY

## 2022-12-30 NOTE — PROGRESS NOTES
Patient returns for follow-up of her amputation site left great toe.    There is minimal residual scab/eschar.      No debridement was performed today.      I discussed with her the need to keep the wound clean and used a gauze to soak the wound once daily.   She will follow-up 1 month p.r.n. for any further problems.

## 2023-04-03 PROBLEM — Z09 POSTOP CHECK: Status: RESOLVED | Noted: 2022-08-08 | Resolved: 2023-04-03

## 2023-06-07 ENCOUNTER — OUTSIDE PLACE OF SERVICE (OUTPATIENT)
Dept: SURGERY | Facility: CLINIC | Age: 63
End: 2023-06-07
Payer: OTHER GOVERNMENT

## 2023-06-07 PROCEDURE — 99223 PR INITIAL HOSPITAL CARE,LEVL III: ICD-10-PCS | Mod: ,,, | Performed by: NURSE PRACTITIONER

## 2023-06-07 PROCEDURE — 99223 1ST HOSP IP/OBS HIGH 75: CPT | Mod: ,,, | Performed by: NURSE PRACTITIONER

## (undated) DEVICE — Device

## (undated) DEVICE — SUTURE VICRYL 2-0 UNDYED CT-1 ANTI

## (undated) DEVICE — TRAY SKIN PREP PROVIDONE IODINE STERILE LATEX FREE

## (undated) DEVICE — SPONGE GAUZE 4X4 12 PLY STL AMD 10/TRAY

## (undated) DEVICE — CDS EXTREMITY

## (undated) DEVICE — SUTURE PROLENE 5-0 BLU C-1 36IN

## (undated) DEVICE — GLOVE SURGICAL PROTEXIS PI SIZE 6.5

## (undated) DEVICE — GLOVE SURGICAL PROTEXIS PI BLUE SIZE 6.5

## (undated) DEVICE — GLOVE SURGICAL PROTEXIS PI SIZE 7.5

## (undated) DEVICE — SYRINGE 10-12CC LURE -LOK TIP

## (undated) DEVICE — GLOVE SURGICAL PROTEXIS PI SIZE 8.0

## (undated) DEVICE — CLIP APPLIER LIGATION SM

## (undated) DEVICE — SUTURE PROLENE 6-0 BV1 DA 30IN

## (undated) DEVICE — APPLICATOR CHLORAPREP HI-LITE TINTED ORANGE 26ML

## (undated) DEVICE — BLADE BOVIE INSULATED COATED 2.75IN

## (undated) DEVICE — SUTURE VICRYL 3-0 SH UNDYED 27IN

## (undated) DEVICE — CDS PERIPHERAL

## (undated) DEVICE — BANDAGE KERLIX AMD  4.5IN  SPONGE ROLL

## (undated) DEVICE — GLOVE SURGICAL PROTEXIS PI SIZE 7

## (undated) DEVICE — SOL IRRIGATION SALINE 0.9% 1000ML BOTTLE